# Patient Record
Sex: FEMALE | Race: BLACK OR AFRICAN AMERICAN | NOT HISPANIC OR LATINO | URBAN - METROPOLITAN AREA
[De-identification: names, ages, dates, MRNs, and addresses within clinical notes are randomized per-mention and may not be internally consistent; named-entity substitution may affect disease eponyms.]

---

## 2024-11-05 ENCOUNTER — INPATIENT (INPATIENT)
Facility: HOSPITAL | Age: 69
LOS: 9 days | Discharge: NURSING, REHAB OR LONG-TERM CARE | End: 2024-11-15
Attending: STUDENT IN AN ORGANIZED HEALTH CARE EDUCATION/TRAINING PROGRAM | Admitting: STUDENT IN AN ORGANIZED HEALTH CARE EDUCATION/TRAINING PROGRAM
Payer: MEDICARE

## 2024-11-05 VITALS
DIASTOLIC BLOOD PRESSURE: 100 MMHG | OXYGEN SATURATION: 99 % | HEART RATE: 104 BPM | RESPIRATION RATE: 18 BRPM | TEMPERATURE: 98 F | SYSTOLIC BLOOD PRESSURE: 155 MMHG | WEIGHT: 154.98 LBS

## 2024-11-05 DIAGNOSIS — F32.A DEPRESSION, UNSPECIFIED: ICD-10-CM

## 2024-11-05 DIAGNOSIS — E78.5 HYPERLIPIDEMIA, UNSPECIFIED: ICD-10-CM

## 2024-11-05 DIAGNOSIS — R41.0 DISORIENTATION, UNSPECIFIED: ICD-10-CM

## 2024-11-05 DIAGNOSIS — E11.9 TYPE 2 DIABETES MELLITUS WITHOUT COMPLICATIONS: ICD-10-CM

## 2024-11-05 DIAGNOSIS — I63.9 CEREBRAL INFARCTION, UNSPECIFIED: ICD-10-CM

## 2024-11-05 DIAGNOSIS — I16.0 HYPERTENSIVE URGENCY: ICD-10-CM

## 2024-11-05 DIAGNOSIS — F03.918 UNSPECIFIED DEMENTIA, UNSPECIFIED SEVERITY, WITH OTHER BEHAVIORAL DISTURBANCE: ICD-10-CM

## 2024-11-05 PROBLEM — F03.90 UNSPECIFIED DEMENTIA, UNSPECIFIED SEVERITY, WITHOUT BEHAVIORAL DISTURBANCE, PSYCHOTIC DISTURBANCE, MOOD DISTURBANCE, AND ANXIETY: Chronic | Status: ACTIVE | Noted: 2024-04-16

## 2024-11-05 PROBLEM — F32.9 MAJOR DEPRESSIVE DISORDER, SINGLE EPISODE, UNSPECIFIED: Chronic | Status: ACTIVE | Noted: 2024-04-16

## 2024-11-05 PROBLEM — I10 ESSENTIAL (PRIMARY) HYPERTENSION: Chronic | Status: ACTIVE | Noted: 2024-04-16

## 2024-11-05 LAB
ALBUMIN SERPL ELPH-MCNC: 4 G/DL — SIGNIFICANT CHANGE UP (ref 3.3–5)
ALP SERPL-CCNC: 63 U/L — SIGNIFICANT CHANGE UP (ref 40–120)
ALT FLD-CCNC: 13 U/L — SIGNIFICANT CHANGE UP (ref 4–33)
ANION GAP SERPL CALC-SCNC: 12 MMOL/L — SIGNIFICANT CHANGE UP (ref 7–14)
APAP SERPL-MCNC: <10 UG/ML — LOW (ref 15–25)
APPEARANCE UR: CLEAR — SIGNIFICANT CHANGE UP
AST SERPL-CCNC: 19 U/L — SIGNIFICANT CHANGE UP (ref 4–32)
BACTERIA # UR AUTO: NEGATIVE /HPF — SIGNIFICANT CHANGE UP
BASOPHILS # BLD AUTO: 0.03 K/UL — SIGNIFICANT CHANGE UP (ref 0–0.2)
BASOPHILS NFR BLD AUTO: 0.5 % — SIGNIFICANT CHANGE UP (ref 0–2)
BILIRUB SERPL-MCNC: <0.2 MG/DL — SIGNIFICANT CHANGE UP (ref 0.2–1.2)
BILIRUB UR-MCNC: NEGATIVE — SIGNIFICANT CHANGE UP
BLOOD GAS VENOUS COMPREHENSIVE RESULT: SIGNIFICANT CHANGE UP
BUN SERPL-MCNC: 8 MG/DL — SIGNIFICANT CHANGE UP (ref 7–23)
CALCIUM SERPL-MCNC: 9.4 MG/DL — SIGNIFICANT CHANGE UP (ref 8.4–10.5)
CAST: 2 /LPF — SIGNIFICANT CHANGE UP (ref 0–4)
CHLORIDE SERPL-SCNC: 98 MMOL/L — SIGNIFICANT CHANGE UP (ref 98–107)
CO2 SERPL-SCNC: 26 MMOL/L — SIGNIFICANT CHANGE UP (ref 22–31)
COLOR SPEC: YELLOW — SIGNIFICANT CHANGE UP
CREAT SERPL-MCNC: 1.24 MG/DL — SIGNIFICANT CHANGE UP (ref 0.5–1.3)
DIFF PNL FLD: NEGATIVE — SIGNIFICANT CHANGE UP
EGFR: 47 ML/MIN/1.73M2 — LOW
EOSINOPHIL # BLD AUTO: 0.17 K/UL — SIGNIFICANT CHANGE UP (ref 0–0.5)
EOSINOPHIL NFR BLD AUTO: 2.6 % — SIGNIFICANT CHANGE UP (ref 0–6)
ETHANOL SERPL-MCNC: <10 MG/DL — SIGNIFICANT CHANGE UP
FLUAV AG NPH QL: SIGNIFICANT CHANGE UP
FLUBV AG NPH QL: SIGNIFICANT CHANGE UP
GLUCOSE BLDC GLUCOMTR-MCNC: 220 MG/DL — HIGH (ref 70–99)
GLUCOSE BLDC GLUCOMTR-MCNC: 247 MG/DL — HIGH (ref 70–99)
GLUCOSE SERPL-MCNC: 223 MG/DL — HIGH (ref 70–99)
GLUCOSE UR QL: 250 MG/DL
HCT VFR BLD CALC: 34.8 % — SIGNIFICANT CHANGE UP (ref 34.5–45)
HGB BLD-MCNC: 11.7 G/DL — SIGNIFICANT CHANGE UP (ref 11.5–15.5)
IANC: 3.45 K/UL — SIGNIFICANT CHANGE UP (ref 1.8–7.4)
IMM GRANULOCYTES NFR BLD AUTO: 0.2 % — SIGNIFICANT CHANGE UP (ref 0–0.9)
KETONES UR-MCNC: NEGATIVE MG/DL — SIGNIFICANT CHANGE UP
LEUKOCYTE ESTERASE UR-ACNC: NEGATIVE — SIGNIFICANT CHANGE UP
LYMPHOCYTES # BLD AUTO: 2.55 K/UL — SIGNIFICANT CHANGE UP (ref 1–3.3)
LYMPHOCYTES # BLD AUTO: 38.8 % — SIGNIFICANT CHANGE UP (ref 13–44)
MCHC RBC-ENTMCNC: 30.1 PG — SIGNIFICANT CHANGE UP (ref 27–34)
MCHC RBC-ENTMCNC: 33.6 G/DL — SIGNIFICANT CHANGE UP (ref 32–36)
MCV RBC AUTO: 89.5 FL — SIGNIFICANT CHANGE UP (ref 80–100)
MONOCYTES # BLD AUTO: 0.36 K/UL — SIGNIFICANT CHANGE UP (ref 0–0.9)
MONOCYTES NFR BLD AUTO: 5.5 % — SIGNIFICANT CHANGE UP (ref 2–14)
NEUTROPHILS # BLD AUTO: 3.45 K/UL — SIGNIFICANT CHANGE UP (ref 1.8–7.4)
NEUTROPHILS NFR BLD AUTO: 52.4 % — SIGNIFICANT CHANGE UP (ref 43–77)
NITRITE UR-MCNC: NEGATIVE — SIGNIFICANT CHANGE UP
NRBC # BLD: 0 /100 WBCS — SIGNIFICANT CHANGE UP (ref 0–0)
NRBC # FLD: 0 K/UL — SIGNIFICANT CHANGE UP (ref 0–0)
PH UR: 7 — SIGNIFICANT CHANGE UP (ref 5–8)
PHOSPHATE SERPL-MCNC: 2.8 MG/DL — SIGNIFICANT CHANGE UP (ref 2.5–4.5)
PLATELET # BLD AUTO: 258 K/UL — SIGNIFICANT CHANGE UP (ref 150–400)
POTASSIUM SERPL-MCNC: 4 MMOL/L — SIGNIFICANT CHANGE UP (ref 3.5–5.3)
POTASSIUM SERPL-SCNC: 4 MMOL/L — SIGNIFICANT CHANGE UP (ref 3.5–5.3)
PROT SERPL-MCNC: 7.7 G/DL — SIGNIFICANT CHANGE UP (ref 6–8.3)
PROT UR-MCNC: NEGATIVE MG/DL — SIGNIFICANT CHANGE UP
RBC # BLD: 3.89 M/UL — SIGNIFICANT CHANGE UP (ref 3.8–5.2)
RBC # FLD: 13.3 % — SIGNIFICANT CHANGE UP (ref 10.3–14.5)
RBC CASTS # UR COMP ASSIST: 1 /HPF — SIGNIFICANT CHANGE UP (ref 0–4)
RSV RNA NPH QL NAA+NON-PROBE: SIGNIFICANT CHANGE UP
SALICYLATES SERPL-MCNC: <0.3 MG/DL — LOW (ref 15–30)
SARS-COV-2 RNA SPEC QL NAA+PROBE: SIGNIFICANT CHANGE UP
SODIUM SERPL-SCNC: 136 MMOL/L — SIGNIFICANT CHANGE UP (ref 135–145)
SP GR SPEC: 1.01 — SIGNIFICANT CHANGE UP (ref 1–1.03)
SQUAMOUS # UR AUTO: 0 /HPF — SIGNIFICANT CHANGE UP (ref 0–5)
TSH SERPL-MCNC: 1.57 UIU/ML — SIGNIFICANT CHANGE UP (ref 0.27–4.2)
UROBILINOGEN FLD QL: 0.2 MG/DL — SIGNIFICANT CHANGE UP (ref 0.2–1)
WBC # BLD: 6.57 K/UL — SIGNIFICANT CHANGE UP (ref 3.8–10.5)
WBC # FLD AUTO: 6.57 K/UL — SIGNIFICANT CHANGE UP (ref 3.8–10.5)
WBC UR QL: 0 /HPF — SIGNIFICANT CHANGE UP (ref 0–5)

## 2024-11-05 PROCEDURE — 99497 ADVNCD CARE PLAN 30 MIN: CPT | Mod: 25

## 2024-11-05 PROCEDURE — 70450 CT HEAD/BRAIN W/O DYE: CPT | Mod: 26,MC

## 2024-11-05 PROCEDURE — 99285 EMERGENCY DEPT VISIT HI MDM: CPT

## 2024-11-05 PROCEDURE — 99223 1ST HOSP IP/OBS HIGH 75: CPT | Mod: 25

## 2024-11-05 PROCEDURE — 71046 X-RAY EXAM CHEST 2 VIEWS: CPT | Mod: 26

## 2024-11-05 RX ORDER — QUETIAPINE FUMARATE 200 MG/1
25 TABLET ORAL AT BEDTIME
Refills: 0 | Status: DISCONTINUED | OUTPATIENT
Start: 2024-11-05 | End: 2024-11-15

## 2024-11-05 RX ORDER — SODIUM CHLORIDE 9 MG/ML
1000 INJECTION, SOLUTION INTRAMUSCULAR; INTRAVENOUS; SUBCUTANEOUS ONCE
Refills: 0 | Status: COMPLETED | OUTPATIENT
Start: 2024-11-05 | End: 2024-11-05

## 2024-11-05 RX ORDER — ESCITALOPRAM 10 MG/1
10 TABLET, FILM COATED ORAL DAILY
Refills: 0 | Status: DISCONTINUED | OUTPATIENT
Start: 2024-11-05 | End: 2024-11-15

## 2024-11-05 RX ORDER — GLUCAGON INJECTION, SOLUTION 1 MG/.2ML
1 INJECTION, SOLUTION SUBCUTANEOUS ONCE
Refills: 0 | Status: DISCONTINUED | OUTPATIENT
Start: 2024-11-05 | End: 2024-11-15

## 2024-11-05 RX ORDER — ENOXAPARIN SODIUM 40MG/0.4ML
40 SYRINGE (ML) SUBCUTANEOUS EVERY 24 HOURS
Refills: 0 | Status: DISCONTINUED | OUTPATIENT
Start: 2024-11-06 | End: 2024-11-15

## 2024-11-05 RX ORDER — METFORMIN HYDROCHLORIDE 500 MG/1
1 TABLET, EXTENDED RELEASE ORAL
Refills: 0 | DISCHARGE

## 2024-11-05 RX ORDER — AMLODIPINE BESYLATE 10 MG
5 TABLET ORAL
Refills: 0 | Status: DISCONTINUED | OUTPATIENT
Start: 2024-11-05 | End: 2024-11-15

## 2024-11-05 RX ORDER — INSULIN LISPRO 100/ML
VIAL (ML) SUBCUTANEOUS AT BEDTIME
Refills: 0 | Status: DISCONTINUED | OUTPATIENT
Start: 2024-11-05 | End: 2024-11-15

## 2024-11-05 RX ORDER — MAGNESIUM, ALUMINUM HYDROXIDE 200-200 MG
30 TABLET,CHEWABLE ORAL EVERY 4 HOURS
Refills: 0 | Status: DISCONTINUED | OUTPATIENT
Start: 2024-11-05 | End: 2024-11-15

## 2024-11-05 RX ORDER — AMLODIPINE BESYLATE 10 MG
5 TABLET ORAL ONCE
Refills: 0 | Status: COMPLETED | OUTPATIENT
Start: 2024-11-05 | End: 2024-11-05

## 2024-11-05 RX ORDER — LISINOPRIL 40 MG
40 TABLET ORAL
Refills: 0 | Status: DISCONTINUED | OUTPATIENT
Start: 2024-11-05 | End: 2024-11-15

## 2024-11-05 RX ORDER — ONDANSETRON HYDROCHLORIDE 2 MG/ML
4 INJECTION, SOLUTION INTRAMUSCULAR; INTRAVENOUS EVERY 8 HOURS
Refills: 0 | Status: DISCONTINUED | OUTPATIENT
Start: 2024-11-05 | End: 2024-11-15

## 2024-11-05 RX ORDER — INSULIN LISPRO 100/ML
VIAL (ML) SUBCUTANEOUS
Refills: 0 | Status: DISCONTINUED | OUTPATIENT
Start: 2024-11-05 | End: 2024-11-15

## 2024-11-05 RX ORDER — ASPIRIN/MAG CARB/ALUMINUM AMIN 325 MG
81 TABLET ORAL DAILY
Refills: 0 | Status: DISCONTINUED | OUTPATIENT
Start: 2024-11-05 | End: 2024-11-15

## 2024-11-05 RX ORDER — ACETAMINOPHEN 500 MG
650 TABLET ORAL EVERY 6 HOURS
Refills: 0 | Status: DISCONTINUED | OUTPATIENT
Start: 2024-11-05 | End: 2024-11-15

## 2024-11-05 RX ORDER — MELATONIN 5 MG
3 TABLET ORAL AT BEDTIME
Refills: 0 | Status: DISCONTINUED | OUTPATIENT
Start: 2024-11-05 | End: 2024-11-15

## 2024-11-05 RX ORDER — LISINOPRIL 40 MG
20 TABLET ORAL DAILY
Refills: 0 | Status: DISCONTINUED | OUTPATIENT
Start: 2024-11-05 | End: 2024-11-05

## 2024-11-05 RX ADMIN — QUETIAPINE FUMARATE 25 MILLIGRAM(S): 200 TABLET ORAL at 23:08

## 2024-11-05 RX ADMIN — Medication 80 MILLIGRAM(S): at 23:09

## 2024-11-05 RX ADMIN — Medication 5 MILLIGRAM(S): at 15:48

## 2024-11-05 RX ADMIN — Medication 20 MILLIGRAM(S): at 18:49

## 2024-11-05 RX ADMIN — SODIUM CHLORIDE 1000 MILLILITER(S): 9 INJECTION, SOLUTION INTRAMUSCULAR; INTRAVENOUS; SUBCUTANEOUS at 11:46

## 2024-11-05 RX ADMIN — SODIUM CHLORIDE 1000 MILLILITER(S): 9 INJECTION, SOLUTION INTRAMUSCULAR; INTRAVENOUS; SUBCUTANEOUS at 18:49

## 2024-11-05 RX ADMIN — Medication 3 MILLIGRAM(S): at 23:08

## 2024-11-05 NOTE — H&P ADULT - CONVERSATION DETAILS
Pt daughter IS agreeable to: Noninvasive mechanical ventilation , IV fluids, Antibiotics, vasopressors    Pt daughter is NOT agreeable to: CPR, Intubation and mechanical ventilation,  Feeding tube,  Dialysis, Blood transfusions
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
## 2024-11-05 NOTE — ED BEHAVIORAL HEALTH ASSESSMENT NOTE - NSBHMSEIMPULSE_PSY_A_CORE
PAST SURGICAL HISTORY:  CAD (coronary artery disease) stents (4)    H/O arthroscopy of knee RT -2 AND LT -1    H/O arthroscopy of shoulder RT ROTATOR CUFF 2019    H/O hernia repair RT INGUINAL -2 AND LT INGUINAL -1    History of parathyroid surgery     Kidney stone RT ESWL OCT 2019    S/P cardiac cath     S/P cataract surgery rt and lt     Normal

## 2024-11-05 NOTE — ED BEHAVIORAL HEALTH ASSESSMENT NOTE - DESCRIPTION
calm and cooperative   ICU Vital Signs Last 24 Hrs  T(C): 36.8 (05 Nov 2024 15:44), Max: 36.9 (05 Nov 2024 09:46)  T(F): 98.2 (05 Nov 2024 15:44), Max: 98.4 (05 Nov 2024 09:46)  HR: 88 (05 Nov 2024 15:44) (88 - 104)  BP: 190/104 (05 Nov 2024 15:44) (155/100 - 190/104)  BP(mean): --  ABP: --  ABP(mean): --  RR: 16 (05 Nov 2024 15:44) (16 - 18)  SpO2: 100% (05 Nov 2024 15:44) (99% - 100%)    O2 Parameters below as of 05 Nov 2024 15:44  Patient On (Oxygen Delivery Method): room air Lives with daughter, , immigrated from [place] HTN, DM

## 2024-11-05 NOTE — ED BEHAVIORAL HEALTH ASSESSMENT NOTE - MEDICAL ISSUES AND PLAN (INCLUDE STANDING AND PRN MEDICATION)
Would continue Metformin 500 mg daily and sliding scale insulin, continue Amlodipine 5-40 daily and Atorvastatin 80 mg hs - see summary -

## 2024-11-05 NOTE — ED ADULT NURSE REASSESSMENT NOTE - NS ED NURSE REASSESS COMMENT FT1
Pt becomes tearful talking about events of the morning - states her daughter called her crazy. Pt states she is safe living with her daughter, just does not like living there with her. Denies abuse or neglect. No signs of trauma or abuse on RN assessment.
Pt remains hypertensive, cannot go to low 5 unit. Pending inpatient medical admission. Remains in stretcher, in view of RNs. safety maintained.
Pt to bathroom for UA, obtained and sent. Back to stretcher. eating meal tray after ok'd by provider. tolerating. denies pain or needs. safety maintained. in view of RN for safety.
Pt received from previous shift. Pt is alert, oriented to name  month and hospital. Unsure of year. States she is 31 years old. Pt states her daughter called an ambulance this morning "because she called me crazy." Pt states to dementia dx. Denies SI/HI/AH/VH. speech is clear, no focal neuro deficits noted. moves all extremities, denies any pain, chest pain or sob. Calm, cooperative. Pt gowned and awaiting provider exam. Call light provided. safety maintained.

## 2024-11-05 NOTE — ED BEHAVIORAL HEALTH ASSESSMENT NOTE - HPI (INCLUDE ILLNESS QUALITY, SEVERITY, DURATION, TIMING, CONTEXT, MODIFYING FACTORS, ASSOCIATED SIGNS AND SYMPTOMS)
Patient is a 69 year old, female; domicile with family; ; noncaregiver; PPH of dementia; no hospitalizations; no known suicide attempts; no known history of violence or arrests; no active substance abuse or known history of complicated withdrawal; PMH of HTN and DM; brought in by EMS; called by daughter for worsening agitation and visual hallucinations.    Patient seen in medical ED. She is a poor historian and is not able to provide HPI. Patient  is A&Ox1 ( knows she is in a hospital), and reports she was brought to the hospital because her "blood pressure was too high." Patient reports she lives with her daughter and her son visits daily. She reports her mood has been intermittently depressed because she has a HHA caring for her and missed her independence. Patient reports her sleep and appetite have been good but acknowledges her short term and long term memory have been declining. Patient denies current or past suicidal ideation or thoughts of hurting others. She denies recent or past aggression and states her daughter is supportive but they do not always get along. Patient ambulates with a walker at home and denies recent falls. Patient denies recent or current auditory/visual hallucinations, ideas of reference or thoughts of paranoia.     See BH note

## 2024-11-05 NOTE — ED PROVIDER NOTE - ATTENDING APP SHARED VISIT CONTRIBUTION OF CARE
DR. HODGES, ATTENDING MD-  I personally saw the patient with the PA and performed a substantive portion of the visit including all aspects of the medical decision making.    68 y/o female h/o dementia dm bibems with inc aggression and leaving the house.  Family not able to care for pt's safety any longer.  Likely worsening dementia.  Calm cooperative here.  Eval for organic cause of worsening dementia.  Obtain cbc cmp ct head ua ucx cxr admit for further care and likely placement.
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
## 2024-11-05 NOTE — ED ADULT NURSE NOTE - OBJECTIVE STATEMENT
Pt received from previous shift. Pt is alert, oriented to name  month and hospital. Unsure of year. States she is 31 years old. Pt states her daughter called an ambulance this morning "because she called me crazy." Pt states to dementia dx. Denies SI/HI/AH/VH. speech is clear, no focal neuro deficits noted. moves all extremities, denies any pain, chest pain or sob. Calm, cooperative.

## 2024-11-05 NOTE — H&P ADULT - NSHPPHYSICALEXAM_GEN_ALL_CORE
PHYSICAL EXAM:  GENERAL: NAD, comfortable at bedside   HEAD:  Atraumatic, Normocephalic  EYES: EOMI, PERRL, conjunctiva and sclera clear  NECK: Supple, No JVD  CHEST/LUNG: Clear to auscultation bilaterally; No wheezes, rales or rhonchi  HEART: Regular rate and rhythm; No murmurs, rubs, or gallops, (+)S1, S2  ABDOMEN: Soft, Nontender, Nondistended; Normal Bowel sounds   EXTREMITIES:  2+ Peripheral Pulses, No clubbing, cyanosis, or edema  PSYCH: calm, answering questions appropriately   NEUROLOGY: AAO: Name and  correctly answered, place- hospital but cannot say which, year   SKIN: No rashes or lesions

## 2024-11-05 NOTE — H&P ADULT - PROBLEM SELECTOR PLAN 3
Chronic moderate exacerbation  Hold home Trulicity weekly, glipizide 10mg daily, metformin 500mg nightly   LDCS with diabetic diet   A1c and lipid panel in AM

## 2024-11-05 NOTE — ED BEHAVIORAL HEALTH ASSESSMENT NOTE - SUMMARY
Patient is a 69 year old, female; domicile with family; ; noncaregiver; PPH of dementia; no hospitalizations; no known suicide attempts; no known history of violence or arrests; no active substance abuse or known history of complicated withdrawal; PMH of HTN and DM; brought in by EMS; called by daughter for worsening agitation and visual hallucinations.    Patient seen and evaluated. She is A&Ox1. Patient has been calm and cooperative since her arrival to the ED and has not required any prn's. Collateral from daughter reports patient has been increasing aggressive and experiencing visual hallucinations. Patient has also been wandering and daughter does not feel safe bringing her home and is seeking long term placement. Patient has short and long term memory impairment and has been diagnosed with dementia. Cat of head revealed old left PCA territory infarct. Patient will be admitted to [facility] on an involuntary status once medically cleared. It is unclear if patient has not been compliant with medications and upon arrival patient was hypertensive 190/104 and hyperglycemic. Per medical NAMAN Menendez, patient is stable ambulating with walker. Patient is a 69 year old, female; domicile with family; ; noncaregiver; PPH of dementia; no hospitalizations; no known suicide attempts; no known history of violence or arrests; no active substance abuse or known history of complicated withdrawal; PMH of HTN and DM; brought in by EMS; called by daughter for worsening agitation and visual hallucinations.    Patient seen and evaluated. She is A&Ox1. Patient has been calm and cooperative since her arrival to the ED and has not required any prn's. Collateral from daughter reports patient has been increasing aggressive and experiencing visual hallucinations. Patient has also been wandering and daughter does not feel safe bringing her home and is seeking long term placement. Patient has short and long term memory impairment and has been diagnosed with dementia. Cat of head revealed old left PCA territory infarct. Patient will be admitted to [facility] on an involuntary status once medically cleared. It is unclear if patient has not been compliant with medications and upon arrival patient was hypertensive 190/104 and hyperglycemic. Per hunter Menendez, patient is stable ambulating with walker.  Recommend  1.Ambulate with walker  2. Discussed medical medications with hunter Menendez. Would hold all diabetic medications except     Metformin 500 mg daily and place on sliding scale coverage.  3. Substitute home medication Amlodipine 5-40 with Amlodipine 5 mg daily and Lisinopril 20 mg daily   4. Would decrease Lexapro to 10 mg daily- inconsistent with home medication  5. Continue Seroquel 25 mg hs  6. Continue Atorvastatin 80 mg hs. Patient is a 69 year old, female; domicile with family; ; noncaregiver; PPH of dementia; no hospitalizations; no known suicide attempts; no known history of violence or arrests; no active substance abuse or known history of complicated withdrawal; PMH of HTN and DM; brought in by EMS; called by daughter for worsening agitation and visual hallucinations.    Patient seen and evaluated. She is A&Ox1. Patient has been calm and cooperative since her arrival to the ED and has not required any prn's. Collateral from daughter reports patient has been increasing aggressive and experiencing visual hallucinations. Patient has also been wandering and daughter does not feel safe bringing her home and is seeking long term placement. Patient has short and long term memory impairment and has been diagnosed with dementia. Cat of head revealed old left PCA territory infarct. Patient will be admitted to [facility] on an involuntary status once medically cleared. It is unclear if patient has not been compliant with medications and upon arrival patient was hypertensive 190/104 and hyperglycemic. Per hunter Menendez, patient is stable ambulating with walker.  Recommend  1.Ambulate with walker  2. Discussed medical medications with hunter Menendez. Would hold all diabetic medications except     Metformin 500 mg daily and place on sliding scale coverage.  3. Substitute home medication Amlodipine 5-40 with Amlodipine 5 mg daily and Lisinopril 20 mg daily   4. Would decrease Lexapro to 10 mg daily- inconsistent with home medication  5. Continue Seroquel 25 mg hs  6. Continue Atorvastatin 80 mg     jAddendum - Patient admitted to medicine for hypertension. CL will follow

## 2024-11-05 NOTE — H&P ADULT - NSHPSOCIALHISTORY_GEN_ALL_CORE
Does not use tobacco products, consume alcohol or partake in illicit drug use   lives with   daughter
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
## 2024-11-05 NOTE — ED BEHAVIORAL HEALTH ASSESSMENT NOTE - NS ED BHA PLAN MSU PSYCHIATRIC ISSUES2 FT
will be followed by CL.  Recommend decreasing Lexapro to 10 mg daily - unsure if patient has been compliant- continue Seroquel 25 mg hs which may help with sleep.

## 2024-11-05 NOTE — ED ADULT NURSE NOTE - BREATH SOUNDS, MLM
What Type Of Note Output Would You Prefer (Optional)?: Standard Output How Severe Is Your Skin Lesion?: mild Has Your Skin Lesion Been Treated?: not been treated Is This A New Presentation, Or A Follow-Up?: Skin Lesion Clear

## 2024-11-05 NOTE — ED BEHAVIORAL HEALTH ASSESSMENT NOTE - NSBHATTESTCOMMENTATTENDFT_PSY_A_CORE
Patient is a 69 year old, female; domicile with family; ; noncaregiver; PPH of dementia; no hospitalizations; no known suicide attempts; no known history of violence or arrests; no active substance abuse or known history of complicated withdrawal; PMH of HTN and DM; brought in by EMS; called by daughter for worsening agitation and visual hallucinations.    Patient seen and evaluated. She is A&Ox1. Patient has been calm and cooperative since her arrival to the ED and has not required any prn's. Collateral from daughter reports patient has been increasing aggressive and experiencing visual hallucinations. Patient has also been wandering and daughter does not feel safe bringing her home and is seeking long term placement.   Pt hypertensive and will be admitted to medicine service.

## 2024-11-05 NOTE — ED PROVIDER NOTE - PROGRESS NOTE DETAILS
NAMAN Sparks: received pt in sign out pending RVP results and repeat BP  RVP negative, repeat BP still slightly elevated- psych requesting medical admission  discussed with hospitalist, 1:1 ordered

## 2024-11-05 NOTE — ED PROVIDER NOTE - CADM POA CENTRAL LINE
Received request via: Pharmacy    Was the patient seen in the last year in this department? Yes    Does the patient have an active prescription (recently filled or refills available) for medication(s) requested? No   No

## 2024-11-05 NOTE — ED PROVIDER NOTE - CRANIAL NERVE AND PUPILLARY EXAM
Acute UTI
cranial nerves 2-12 intact/cough reflex intact/corneal reflex intact/central and peripheral vision intact

## 2024-11-05 NOTE — ED BEHAVIORAL HEALTH ASSESSMENT NOTE - RISK ASSESSMENT
low risk - denies suicidal ideation, no prior suicide attempts, Baptism, no substance abuse,   risk- advanced age, depressed.
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
## 2024-11-05 NOTE — ED BEHAVIORAL HEALTH ASSESSMENT NOTE - CURRENT MEDICATION
Escitalopram 20mg daily, quetiapine 25mg daily, metformin 500mg daily, Trulicity 3mg weekly, Farxiga 5mg daily, glipizide 10mg daily, amlodipine 5-40mg daily, and atorvastatin 80mg daily

## 2024-11-05 NOTE — H&P ADULT - NSHPLABSRESULTS_GEN_ALL_CORE
What Type Of Note Output Would You Prefer (Optional)?: Bullet Format Hpi Title: Evaluation of Skin Lesions 11.7   6.57  )-----------( 258      ( 2024 11:14 )             34.8     136  |  98  |  8   ----------------------------<  223[H]       4.0   |  26  |  1.24    Ca    9.4      2024 11:14  Phos  2.8         TPro  7.7  /  Alb  4.0  /  TBili  <0.2  /  DBili  x   /  AST  19  /  ALT  13  /  AlkPhos  63          11:45 - VBG - pH: 7.37  | pCO2: 54    | pO2: 39    | Lactate: 1.9        Urinalysis Basic - ( 2024 13:30 )  Color: Yellow / Appearance: Clear / S.007 / pH: 7.0  Gluc: 250 mg/dL / Ketone: Negative mg/dL  / Bili: Negative / Urobili: 0.2 mg/dL   Blood: Negative / Protein: Negative mg/dL / Nitrite: Negative   Leuk Esterase: Negative / RBC: 1 /HPF / WBC 0 /HPF   Sq Epi: 0 /HPF / Bacteria: Negative /HPF  Hyaline Casts: x/WBC Casts: x      ekg interpreted by myself: nsr   CXR interpreted by myself: clear lungs  CT head interpreted by radiology: No acute intracranial hemorrhage, mass effect, or midline shift. Old left   PCA territory infarct. If clinical examination persists, consider further   evaluation via MR imaging to include DWI and ADC mapping techniques,   provided there are no contraindications.

## 2024-11-05 NOTE — ED ADULT TRIAGE NOTE - CHIEF COMPLAINT QUOTE
pt brought in by EMS from home sent by daughter because she was packing her bags and trying to leave the house. pt has a hx of dementia and diabetes. pt denies chest pain, sob, n/v/d, fever or chills.

## 2024-11-05 NOTE — ED ADULT TRIAGE NOTE - NSWEIGHTCALCTOOLDRUG_GEN_A_CORE
Fax from pharmacy, insurance will not cover longer than 7 days at a time. PA needed.   Faxed to [facility].     used
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
## 2024-11-05 NOTE — ED ADULT NURSE NOTE - NSFALLHARMRISKINTERV_ED_ALL_ED

## 2024-11-05 NOTE — ED PROVIDER NOTE - NSICDXPASTMEDICALHX_GEN_ALL_CORE_FT
PAST MEDICAL HISTORY:  CVA (cerebrovascular accident)     Dementia     Diabetes mellitus type II, non insulin dependent     HLD (hyperlipidemia)     HTN (hypertension)     Major depression, chronic
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
## 2024-11-05 NOTE — ED BEHAVIORAL HEALTH ASSESSMENT NOTE - NSBHATTESTAPPAMEND_PSY_A_CORE
I have personally seen and examined this patient. I fully participated in the care of this patient. I have made amendments to the documentation where appropriate and otherwise agree with the history, physical exam, and plan as documented by the CASI

## 2024-11-05 NOTE — H&P ADULT - PROBLEM SELECTOR PLAN 2
New  worsening aggression with agitation  UA/RVP/CXR wnl   consulted: "Would decrease Lexapro to 10 mg daily- inconsistent with home medication. Continue Seroquel 25 mg hs"  Case management consulted as daughter would like placement for the pt on d/c

## 2024-11-06 LAB
A1C WITH ESTIMATED AVERAGE GLUCOSE RESULT: 6.6 % — HIGH (ref 4–5.6)
ANION GAP SERPL CALC-SCNC: 13 MMOL/L — SIGNIFICANT CHANGE UP (ref 7–14)
BASOPHILS # BLD AUTO: 0.03 K/UL — SIGNIFICANT CHANGE UP (ref 0–0.2)
BASOPHILS NFR BLD AUTO: 0.4 % — SIGNIFICANT CHANGE UP (ref 0–2)
BUN SERPL-MCNC: 7 MG/DL — SIGNIFICANT CHANGE UP (ref 7–23)
CALCIUM SERPL-MCNC: 8.9 MG/DL — SIGNIFICANT CHANGE UP (ref 8.4–10.5)
CHLORIDE SERPL-SCNC: 106 MMOL/L — SIGNIFICANT CHANGE UP (ref 98–107)
CHOLEST SERPL-MCNC: 212 MG/DL — HIGH
CO2 SERPL-SCNC: 24 MMOL/L — SIGNIFICANT CHANGE UP (ref 22–31)
CREAT SERPL-MCNC: 1.02 MG/DL — SIGNIFICANT CHANGE UP (ref 0.5–1.3)
EGFR: 59 ML/MIN/1.73M2 — LOW
EOSINOPHIL # BLD AUTO: 0.22 K/UL — SIGNIFICANT CHANGE UP (ref 0–0.5)
EOSINOPHIL NFR BLD AUTO: 3.1 % — SIGNIFICANT CHANGE UP (ref 0–6)
ESTIMATED AVERAGE GLUCOSE: 143 — SIGNIFICANT CHANGE UP
GLUCOSE BLDC GLUCOMTR-MCNC: 148 MG/DL — HIGH (ref 70–99)
GLUCOSE BLDC GLUCOMTR-MCNC: 157 MG/DL — HIGH (ref 70–99)
GLUCOSE BLDC GLUCOMTR-MCNC: 165 MG/DL — HIGH (ref 70–99)
GLUCOSE BLDC GLUCOMTR-MCNC: 205 MG/DL — HIGH (ref 70–99)
GLUCOSE SERPL-MCNC: 149 MG/DL — HIGH (ref 70–99)
HCT VFR BLD CALC: 32.9 % — LOW (ref 34.5–45)
HDLC SERPL-MCNC: 74 MG/DL — SIGNIFICANT CHANGE UP
HGB BLD-MCNC: 11.1 G/DL — LOW (ref 11.5–15.5)
IANC: 2.38 K/UL — SIGNIFICANT CHANGE UP (ref 1.8–7.4)
IMM GRANULOCYTES NFR BLD AUTO: 0.1 % — SIGNIFICANT CHANGE UP (ref 0–0.9)
LIPID PNL WITH DIRECT LDL SERPL: 115 MG/DL — HIGH
LYMPHOCYTES # BLD AUTO: 3.94 K/UL — HIGH (ref 1–3.3)
LYMPHOCYTES # BLD AUTO: 56.2 % — HIGH (ref 13–44)
MCHC RBC-ENTMCNC: 30.1 PG — SIGNIFICANT CHANGE UP (ref 27–34)
MCHC RBC-ENTMCNC: 33.7 G/DL — SIGNIFICANT CHANGE UP (ref 32–36)
MCV RBC AUTO: 89.2 FL — SIGNIFICANT CHANGE UP (ref 80–100)
MONOCYTES # BLD AUTO: 0.43 K/UL — SIGNIFICANT CHANGE UP (ref 0–0.9)
MONOCYTES NFR BLD AUTO: 6.1 % — SIGNIFICANT CHANGE UP (ref 2–14)
NEUTROPHILS # BLD AUTO: 2.38 K/UL — SIGNIFICANT CHANGE UP (ref 1.8–7.4)
NEUTROPHILS NFR BLD AUTO: 34.1 % — LOW (ref 43–77)
NON HDL CHOLESTEROL: 138 MG/DL — HIGH
NRBC # BLD: 0 /100 WBCS — SIGNIFICANT CHANGE UP (ref 0–0)
NRBC # FLD: 0 K/UL — SIGNIFICANT CHANGE UP (ref 0–0)
PLATELET # BLD AUTO: 232 K/UL — SIGNIFICANT CHANGE UP (ref 150–400)
POTASSIUM SERPL-MCNC: 4 MMOL/L — SIGNIFICANT CHANGE UP (ref 3.5–5.3)
POTASSIUM SERPL-SCNC: 4 MMOL/L — SIGNIFICANT CHANGE UP (ref 3.5–5.3)
RBC # BLD: 3.69 M/UL — LOW (ref 3.8–5.2)
RBC # FLD: 13.1 % — SIGNIFICANT CHANGE UP (ref 10.3–14.5)
SODIUM SERPL-SCNC: 143 MMOL/L — SIGNIFICANT CHANGE UP (ref 135–145)
TRIGL SERPL-MCNC: 117 MG/DL — SIGNIFICANT CHANGE UP
WBC # BLD: 7.01 K/UL — SIGNIFICANT CHANGE UP (ref 3.8–10.5)
WBC # FLD AUTO: 7.01 K/UL — SIGNIFICANT CHANGE UP (ref 3.8–10.5)

## 2024-11-06 PROCEDURE — 99231 SBSQ HOSP IP/OBS SF/LOW 25: CPT

## 2024-11-06 PROCEDURE — 99233 SBSQ HOSP IP/OBS HIGH 50: CPT

## 2024-11-06 RX ORDER — INFLUENZ VIR VAC TV P-SURF2003 15MCG/.5ML
0.5 SYRINGE (ML) INTRAMUSCULAR ONCE
Refills: 0 | Status: DISCONTINUED | OUTPATIENT
Start: 2024-11-06 | End: 2024-11-15

## 2024-11-06 RX ORDER — OLANZAPINE 20 MG/1
1.25 TABLET ORAL EVERY 6 HOURS
Refills: 0 | Status: DISCONTINUED | OUTPATIENT
Start: 2024-11-06 | End: 2024-11-15

## 2024-11-06 RX ADMIN — Medication 40 MILLIGRAM(S): at 09:02

## 2024-11-06 RX ADMIN — Medication 1: at 08:53

## 2024-11-06 RX ADMIN — ESCITALOPRAM 10 MILLIGRAM(S): 10 TABLET, FILM COATED ORAL at 12:42

## 2024-11-06 RX ADMIN — Medication 81 MILLIGRAM(S): at 12:42

## 2024-11-06 RX ADMIN — Medication 80 MILLIGRAM(S): at 21:17

## 2024-11-06 RX ADMIN — Medication 40 MILLIGRAM(S): at 18:22

## 2024-11-06 RX ADMIN — QUETIAPINE FUMARATE 25 MILLIGRAM(S): 200 TABLET ORAL at 21:17

## 2024-11-06 RX ADMIN — Medication 1: at 12:30

## 2024-11-06 RX ADMIN — Medication 5 MILLIGRAM(S): at 09:02

## 2024-11-06 NOTE — PROGRESS NOTE ADULT - SUBJECTIVE AND OBJECTIVE BOX
[facility] Division of Hospital Medicine  Kristen Leach MD  Pager (M-F, 8A-5P):  In-house pager [phone]; Long-range pager [phone]  Other Times:  Please page Hospitalist in Charge -  In-house pager [phone]    Patient is a 69y old  Female who presents with a chief complaint of hypertensive urgency, increased agitation (05 Nov 2024 20:52)    SUBJECTIVE / OVERNIGHT EVENTS:  No problems reported over night.    ADDITIONAL REVIEW OF SYSTEMS:    MEDICATIONS  (STANDING):  amLODIPine   Tablet 5 milliGRAM(s) Oral with breakfast  aspirin enteric coated 81 milliGRAM(s) Oral daily  atorvastatin 80 milliGRAM(s) Oral at bedtime  dextrose 5%. 1000 milliLiter(s) (50 mL/Hr) IV Continuous <Continuous>  dextrose 5%. 1000 milliLiter(s) (100 mL/Hr) IV Continuous <Continuous>  dextrose 50% Injectable 25 Gram(s) IV Push once  dextrose 50% Injectable 12.5 Gram(s) IV Push once  dextrose 50% Injectable 25 Gram(s) IV Push once  enoxaparin Injectable 40 milliGRAM(s) SubCutaneous every 24 hours  escitalopram 10 milliGRAM(s) Oral daily  glucagon  Injectable 1 milliGRAM(s) IntraMuscular once  influenza  Vaccine (HIGH DOSE) 0.5 milliLiter(s) IntraMuscular once  insulin lispro (ADMELOG) corrective regimen sliding scale   SubCutaneous three times a day before meals  insulin lispro (ADMELOG) corrective regimen sliding scale   SubCutaneous at bedtime  lisinopril 40 milliGRAM(s) Oral with breakfast  QUEtiapine 25 milliGRAM(s) Oral at bedtime    MEDICATIONS  (PRN):  acetaminophen     Tablet .. 650 milliGRAM(s) Oral every 6 hours PRN Temp greater or equal to 38C (100.4F), Mild Pain (1 - 3)  aluminum hydroxide/magnesium hydroxide/simethicone Suspension 30 milliLiter(s) Oral every 4 hours PRN Dyspepsia  dextrose Oral Gel 15 Gram(s) Oral once PRN Blood Glucose LESS THAN 70 milliGRAM(s)/deciliter  melatonin 3 milliGRAM(s) Oral at bedtime PRN Insomnia  ondansetron Injectable 4 milliGRAM(s) IV Push every 8 hours PRN Nausea and/or Vomiting    CAPILLARY BLOOD GLUCOSE  POCT Blood Glucose.: 165 mg/dL (06 Nov 2024 11:29)  POCT Blood Glucose.: 157 mg/dL (06 Nov 2024 07:29)  POCT Blood Glucose.: 220 mg/dL (05 Nov 2024 22:15)  POCT Blood Glucose.: 247 mg/dL (05 Nov 2024 20:51)  POCT Blood Glucose.: 192 mg/dL (05 Nov 2024 16:07)    PHYSICAL EXAM:  Vital Signs Last 24 Hrs  T(C): 36.8 (06 Nov 2024 11:00), Max: 37 (05 Nov 2024 20:08)  T(F): 98.2 (06 Nov 2024 11:00), Max: 98.6 (05 Nov 2024 20:08)  HR: 76 (06 Nov 2024 11:00) (76 - 90)  BP: 149/87 (06 Nov 2024 11:00) (145/74 - 190/104)  BP(mean): 134 (05 Nov 2024 20:08) (134 - 134)  RR: 18 (06 Nov 2024 11:00) (16 - 18)  SpO2: 98% (06 Nov 2024 11:00) (98% - 100%)    Parameters below as of 06 Nov 2024 11:00  Patient On (Oxygen Delivery Method): room air    CONSTITUTIONAL: resting comfortably in bed,   RESPIRATORY: Normal respiratory effort; lungs are clear to auscultation bilaterally  CARDIOVASCULAR: Regular rate and rhythm, normal S1 and S2, no murmur/rub/gallop; No lower extremity edema; Peripheral pulses are 2+ bilaterally  ABDOMEN: Nontender to palpation, normoactive bowel sounds, no rebound/guarding  MUSCLOSKELETAL: no clubbing or cyanosis of digits; no joint swelling or tenderness to palpation  PSYCH: calm, oriented self, hospital  NEUROLOGY: CN 2-12 are intact and symmetric; no gross sensory deficits;   SKIN: No rashes; no palpable lesions    LABS:                        11.1   7.01  )-----------( 232      ( 06 Nov 2024 02:30 )             32.9     11-06    143  |  106  |  7   ----------------------------<  149[H]  4.0   |  24  |  1.02    Ca    8.9      06 Nov 2024 02:30  Phos  2.8     11-05    TPro  7.7  /  Alb  4.0  /  TBili  <0.2  /  DBili  x   /  AST  19  /  ALT  13  /  AlkPhos  63  11-05    RADIOLOGY & ADDITIONAL TESTS:  Results Reviewed:   Imaging Personally Reviewed:  Electrocardiogram Personally Reviewed:    COORDINATION OF CARE:  Care Discussed with Consultants/Other Providers [Y/N]: RN, SW, CM, ACP, Psych re overall care   Prior or Outpatient Records Reviewed [Y/N]:   No [facility] Division of Hospital Medicine  Kristen Leach MD  Pager (M-F, 8A-5P):  In-house pager [phone]; Long-range pager [phone]  Other Times:  Please page Hospitalist in Charge -  In-house pager [phone]    Patient is a 69y old  Female who presents with a chief complaint of hypertensive urgency, increased agitation (05 Nov 2024 20:52)    SUBJECTIVE / OVERNIGHT EVENTS:  No problems reported over night.  Pt seen earlier this am, calm, pleasant. Oriented to self, hospital. Not sure why here. No complaints.   ADDITIONAL REVIEW OF SYSTEMS:    MEDICATIONS  (STANDING):  amLODIPine   Tablet 5 milliGRAM(s) Oral with breakfast  aspirin enteric coated 81 milliGRAM(s) Oral daily  atorvastatin 80 milliGRAM(s) Oral at bedtime  dextrose 5%. 1000 milliLiter(s) (50 mL/Hr) IV Continuous <Continuous>  dextrose 5%. 1000 milliLiter(s) (100 mL/Hr) IV Continuous <Continuous>  dextrose 50% Injectable 25 Gram(s) IV Push once  dextrose 50% Injectable 12.5 Gram(s) IV Push once  dextrose 50% Injectable 25 Gram(s) IV Push once  enoxaparin Injectable 40 milliGRAM(s) SubCutaneous every 24 hours  escitalopram 10 milliGRAM(s) Oral daily  glucagon  Injectable 1 milliGRAM(s) IntraMuscular once  influenza  Vaccine (HIGH DOSE) 0.5 milliLiter(s) IntraMuscular once  insulin lispro (ADMELOG) corrective regimen sliding scale   SubCutaneous three times a day before meals  insulin lispro (ADMELOG) corrective regimen sliding scale   SubCutaneous at bedtime  lisinopril 40 milliGRAM(s) Oral with breakfast  QUEtiapine 25 milliGRAM(s) Oral at bedtime    MEDICATIONS  (PRN):  acetaminophen     Tablet .. 650 milliGRAM(s) Oral every 6 hours PRN Temp greater or equal to 38C (100.4F), Mild Pain (1 - 3)  aluminum hydroxide/magnesium hydroxide/simethicone Suspension 30 milliLiter(s) Oral every 4 hours PRN Dyspepsia  dextrose Oral Gel 15 Gram(s) Oral once PRN Blood Glucose LESS THAN 70 milliGRAM(s)/deciliter  melatonin 3 milliGRAM(s) Oral at bedtime PRN Insomnia  ondansetron Injectable 4 milliGRAM(s) IV Push every 8 hours PRN Nausea and/or Vomiting    CAPILLARY BLOOD GLUCOSE  POCT Blood Glucose.: 165 mg/dL (06 Nov 2024 11:29)  POCT Blood Glucose.: 157 mg/dL (06 Nov 2024 07:29)  POCT Blood Glucose.: 220 mg/dL (05 Nov 2024 22:15)  POCT Blood Glucose.: 247 mg/dL (05 Nov 2024 20:51)  POCT Blood Glucose.: 192 mg/dL (05 Nov 2024 16:07)    PHYSICAL EXAM:  Vital Signs Last 24 Hrs  T(C): 36.8 (06 Nov 2024 11:00), Max: 37 (05 Nov 2024 20:08)  T(F): 98.2 (06 Nov 2024 11:00), Max: 98.6 (05 Nov 2024 20:08)  HR: 76 (06 Nov 2024 11:00) (76 - 90)  BP: 149/87 (06 Nov 2024 11:00) (145/74 - 190/104)  BP(mean): 134 (05 Nov 2024 20:08) (134 - 134)  RR: 18 (06 Nov 2024 11:00) (16 - 18)  SpO2: 98% (06 Nov 2024 11:00) (98% - 100%)    Parameters below as of 06 Nov 2024 11:00  Patient On (Oxygen Delivery Method): room air    CONSTITUTIONAL: resting comfortably in bed,   RESPIRATORY: Normal respiratory effort; lungs are clear to auscultation bilaterally  CARDIOVASCULAR: Regular rate and rhythm, normal S1 and S2, no murmur/rub/gallop; No lower extremity edema; Peripheral pulses are 2+ bilaterally  ABDOMEN: Nontender to palpation, normoactive bowel sounds, no rebound/guarding  MUSCLOSKELETAL: no clubbing or cyanosis of digits; no joint swelling or tenderness to palpation  PSYCH: calm, oriented self, hospital  NEUROLOGY: CN 2-12 are intact and symmetric; no gross sensory deficits;   SKIN: No rashes; no palpable lesions    LABS:                        11.1   7.01  )-----------( 232      ( 06 Nov 2024 02:30 )             32.9     11-06    143  |  106  |  7   ----------------------------<  149[H]  4.0   |  24  |  1.02    Ca    8.9      06 Nov 2024 02:30  Phos  2.8     11-05    TPro  7.7  /  Alb  4.0  /  TBili  <0.2  /  DBili  x   /  AST  19  /  ALT  13  /  AlkPhos  63  11-05    RADIOLOGY & ADDITIONAL TESTS:  Results Reviewed:   Imaging Personally Reviewed:  Electrocardiogram Personally Reviewed:    COORDINATION OF CARE:  Care Discussed with Consultants/Other Providers [Y/N]: RN, SW, CM, ACP, Psych re overall care   Prior or Outpatient Records Reviewed [Y/N]:

## 2024-11-06 NOTE — BH CONSULTATION LIAISON PROGRESS NOTE - NSBHTIMEACTIVITIESPERFORMED_PSY_A_CORE
25 minutes spent on total encounter. The necessity of the time spent during the encounter on this date of service was due to:     I had a face to face encounter with this patient. I spent 25 total minutes on the bedside interview and examination, coordination of care, counseling, chart review, and documentation for this patient.  2

## 2024-11-06 NOTE — PROGRESS NOTE ADULT - PROBLEM SELECTOR PLAN 2
a/w /113  Continue amlodipine-benazapril 5-40mg daily formulary  Monitor a/w /113  Continue amlodipine-benazapril 5-40mg daily formulary  improved  Monitor

## 2024-11-06 NOTE — PROGRESS NOTE ADULT - PROBLEM SELECTOR PLAN 1
worsening aggression with agitation  UA/RVP/CXR wnl   consulted: "Would decrease Lexapro to 10 mg daily- inconsistent with home medication. Continue Seroquel 25 mg hs"  Case management consulted as daughter would like placement for the pt on d/c worsening aggression with agitation  UA/RVP/CXR wnl  appreciate psych input - decreased Lexapro to 10 mg daily; c/w Seroquel 25 mg hs; Zyprexa 1.25 q6 PRN agitation/not redirectable  Case management consulted as daughter would like placement for the pt on d/c

## 2024-11-06 NOTE — PROGRESS NOTE ADULT - ASSESSMENT
69F dementia, HTN, DM, a/w increased agitation and hypertensive urgency 69F dementia, h/o CVA 9/11/23 (subsequent L eye blindness), HTN, DM, a/w increased agitation and hypertensive urgency

## 2024-11-06 NOTE — BH CONSULTATION LIAISON PROGRESS NOTE - CURRENT MEDICATION
MEDICATIONS  (STANDING):  amLODIPine   Tablet 5 milliGRAM(s) Oral with breakfast  aspirin enteric coated 81 milliGRAM(s) Oral daily  atorvastatin 80 milliGRAM(s) Oral at bedtime  dextrose 5%. 1000 milliLiter(s) (100 mL/Hr) IV Continuous <Continuous>  dextrose 5%. 1000 milliLiter(s) (50 mL/Hr) IV Continuous <Continuous>  dextrose 50% Injectable 12.5 Gram(s) IV Push once  dextrose 50% Injectable 25 Gram(s) IV Push once  dextrose 50% Injectable 25 Gram(s) IV Push once  enoxaparin Injectable 40 milliGRAM(s) SubCutaneous every 24 hours  escitalopram 10 milliGRAM(s) Oral daily  glucagon  Injectable 1 milliGRAM(s) IntraMuscular once  influenza  Vaccine (HIGH DOSE) 0.5 milliLiter(s) IntraMuscular once  insulin lispro (ADMELOG) corrective regimen sliding scale   SubCutaneous three times a day before meals  insulin lispro (ADMELOG) corrective regimen sliding scale   SubCutaneous at bedtime  lisinopril 40 milliGRAM(s) Oral with breakfast  QUEtiapine 25 milliGRAM(s) Oral at bedtime    MEDICATIONS  (PRN):  acetaminophen     Tablet .. 650 milliGRAM(s) Oral every 6 hours PRN Temp greater or equal to 38C (100.4F), Mild Pain (1 - 3)  aluminum hydroxide/magnesium hydroxide/simethicone Suspension 30 milliLiter(s) Oral every 4 hours PRN Dyspepsia  dextrose Oral Gel 15 Gram(s) Oral once PRN Blood Glucose LESS THAN 70 milliGRAM(s)/deciliter  melatonin 3 milliGRAM(s) Oral at bedtime PRN Insomnia  ondansetron Injectable 4 milliGRAM(s) IV Push every 8 hours PRN Nausea and/or Vomiting

## 2024-11-06 NOTE — PHYSICAL THERAPY INITIAL EVALUATION ADULT - ADDITIONAL COMMENTS
Patient lives with daughter in private home, no steps to negotiate. Patient was independent in ADL prior to admission and was not using an assistive device prior to admission.

## 2024-11-06 NOTE — BH CONSULTATION LIAISON PROGRESS NOTE - NSBHCHARTREVIEWVS_PSY_A_CORE FT
Vital Signs Last 24 Hrs  T(C): 36.8 (06 Nov 2024 05:00), Max: 37 (05 Nov 2024 20:08)  T(F): 98.2 (06 Nov 2024 05:00), Max: 98.6 (05 Nov 2024 20:08)  HR: 82 (06 Nov 2024 05:00) (82 - 90)  BP: 145/74 (06 Nov 2024 05:00) (145/74 - 190/104)  BP(mean): 134 (05 Nov 2024 20:08) (134 - 134)  RR: 18 (06 Nov 2024 05:00) (16 - 18)  SpO2: 99% (06 Nov 2024 05:00) (99% - 100%)    Parameters below as of 06 Nov 2024 05:00  Patient On (Oxygen Delivery Method): room air

## 2024-11-06 NOTE — BH CONSULTATION LIAISON PROGRESS NOTE - NSBHCHARTREVIEWLAB_PSY_A_CORE FT
11.1   7.01  )-----------( 232      ( 06 Nov 2024 02:30 )             32.9   11-06    143  |  106  |  7   ----------------------------<  149[H]  4.0   |  24  |  1.02    Ca    8.9      06 Nov 2024 02:30  Phos  2.8     11-05    TPro  7.7  /  Alb  4.0  /  TBili  <0.2  /  DBili  x   /  AST  19  /  ALT  13  /  AlkPhos  63  11-05

## 2024-11-06 NOTE — BH CONSULTATION LIAISON PROGRESS NOTE - NSBHFUPINTERVALHXFT_PSY_A_CORE
patient was seen and assessed at bedside. patient is awake, alert. Aware she is in the hospital but uncertain which, does not know why she is here. States it is 2003. Patient is calm, pleasant, no agitation or aggression on the unit. Feels safe. Denies AH and VH on this exam and also reports she has no VH at home ( documentation from ED BH note states family noted VH at home).  no si or hi elicited. eating and sleeping well overnight.

## 2024-11-06 NOTE — PATIENT PROFILE ADULT - FALL HARM RISK - HARM RISK INTERVENTIONS

## 2024-11-06 NOTE — PROGRESS NOTE ADULT - NSPROGADDITIONALINFOA_GEN_ALL_CORE
d/w daughter [phone] - pt with h/o CVA 9/11/23 (subsequent L eye blindness), was at rehab in [facility] till January. Has had 24h caretaker who just quit this week bec pt more agitated. Having trouble caring for pt at home. Now wants LTC placement. SW to f/u.

## 2024-11-06 NOTE — BH CONSULTATION LIAISON PROGRESS NOTE - NSBHASSESSMENTFT_PSY_ALL_CORE
Patient is a 69 year old, female; domicile with family; ; noncaregiver; PPH of dementia; no hospitalizations; no known suicide attempts; no known history of violence or arrests; no active substance abuse or known history of complicated withdrawal; PMH of HTN and DM; brought in by EMS; called by daughter for worsening agitation and visual hallucinations.    PLAN  - patient is on 7s, defer level of observation to primary team  - decreased Lexapro to 10 mg daily- inconsistent with home medication  - Continue Seroquel 25 mg hs  -- antipsychotics can only be given if qtc < 500   - PRN for agitation: Zyprexa 1.25mg q6hrs  -- if with refractory agitation after 30 minutes can give an additional 1.25mg Zyprexa   - Sw to discuss placement with family as requested on admission

## 2024-11-07 LAB
GLUCOSE BLDC GLUCOMTR-MCNC: 163 MG/DL — HIGH (ref 70–99)
GLUCOSE BLDC GLUCOMTR-MCNC: 192 MG/DL — HIGH (ref 70–99)
GLUCOSE BLDC GLUCOMTR-MCNC: 213 MG/DL — HIGH (ref 70–99)
GLUCOSE BLDC GLUCOMTR-MCNC: 223 MG/DL — HIGH (ref 70–99)

## 2024-11-07 PROCEDURE — 99232 SBSQ HOSP IP/OBS MODERATE 35: CPT

## 2024-11-07 RX ADMIN — QUETIAPINE FUMARATE 25 MILLIGRAM(S): 200 TABLET ORAL at 21:14

## 2024-11-07 RX ADMIN — Medication 40 MILLIGRAM(S): at 08:51

## 2024-11-07 RX ADMIN — Medication 2: at 12:30

## 2024-11-07 RX ADMIN — Medication 81 MILLIGRAM(S): at 12:31

## 2024-11-07 RX ADMIN — Medication 5 MILLIGRAM(S): at 08:51

## 2024-11-07 RX ADMIN — Medication 2: at 16:40

## 2024-11-07 RX ADMIN — Medication 1: at 08:33

## 2024-11-07 RX ADMIN — Medication 80 MILLIGRAM(S): at 21:14

## 2024-11-07 RX ADMIN — ESCITALOPRAM 10 MILLIGRAM(S): 10 TABLET, FILM COATED ORAL at 12:31

## 2024-11-07 RX ADMIN — Medication 40 MILLIGRAM(S): at 17:11

## 2024-11-07 NOTE — PROGRESS NOTE ADULT - PROBLEM SELECTOR PLAN 2
a/w /113  Continue amlodipine-benazapril 5-40mg daily formulary  improved  Monitor PAST MEDICAL HISTORY:  No pertinent past medical history

## 2024-11-07 NOTE — PROGRESS NOTE ADULT - PROBLEM SELECTOR PLAN 1
worsening aggression with agitation  UA/RVP/CXR wnl  appreciate psych input - decreased Lexapro to 10 mg daily; c/w Seroquel 25 mg hs; Zyprexa 1.25 q6 PRN agitation/not redirectable  Case management consulted as daughter would like placement for the pt on d/c

## 2024-11-07 NOTE — PROGRESS NOTE ADULT - SUBJECTIVE AND OBJECTIVE BOX
[facility] Division of Hospital Medicine  Kristen Leach MD  Pager (M-F, 8A-5P):  In-house pager [phone]; Long-range pager [phone]  Other Times:  Please page Hospitalist in Charge -  In-house pager [phone]    Patient is a 69y old  Female who presents with a chief complaint of hypertensive urgency, increased agitation (06 Nov 2024 14:13)    SUBJECTIVE / OVERNIGHT EVENTS:  No problems reported over night.    ADDITIONAL REVIEW OF SYSTEMS:    MEDICATIONS  (STANDING):  amLODIPine   Tablet 5 milliGRAM(s) Oral with breakfast  aspirin enteric coated 81 milliGRAM(s) Oral daily  atorvastatin 80 milliGRAM(s) Oral at bedtime  dextrose 5%. 1000 milliLiter(s) (50 mL/Hr) IV Continuous <Continuous>  dextrose 5%. 1000 milliLiter(s) (100 mL/Hr) IV Continuous <Continuous>  dextrose 50% Injectable 25 Gram(s) IV Push once  dextrose 50% Injectable 12.5 Gram(s) IV Push once  dextrose 50% Injectable 25 Gram(s) IV Push once  enoxaparin Injectable 40 milliGRAM(s) SubCutaneous every 24 hours  escitalopram 10 milliGRAM(s) Oral daily  glucagon  Injectable 1 milliGRAM(s) IntraMuscular once  influenza  Vaccine (HIGH DOSE) 0.5 milliLiter(s) IntraMuscular once  insulin lispro (ADMELOG) corrective regimen sliding scale   SubCutaneous three times a day before meals  insulin lispro (ADMELOG) corrective regimen sliding scale   SubCutaneous at bedtime  lisinopril 40 milliGRAM(s) Oral with breakfast  QUEtiapine 25 milliGRAM(s) Oral at bedtime    MEDICATIONS  (PRN):  acetaminophen     Tablet .. 650 milliGRAM(s) Oral every 6 hours PRN Temp greater or equal to 38C (100.4F), Mild Pain (1 - 3)  aluminum hydroxide/magnesium hydroxide/simethicone Suspension 30 milliLiter(s) Oral every 4 hours PRN Dyspepsia  dextrose Oral Gel 15 Gram(s) Oral once PRN Blood Glucose LESS THAN 70 milliGRAM(s)/deciliter  melatonin 3 milliGRAM(s) Oral at bedtime PRN Insomnia  OLANZapine 1.25 milliGRAM(s) Oral every 6 hours PRN agitation, not redirectable  OLANZapine Injectable 1.25 milliGRAM(s) IntraMuscular every 6 hours PRN agitation, not redirectable  ondansetron Injectable 4 milliGRAM(s) IV Push every 8 hours PRN Nausea and/or Vomiting    CAPILLARY BLOOD GLUCOSE  POCT Blood Glucose.: 163 mg/dL (07 Nov 2024 07:34)  POCT Blood Glucose.: 205 mg/dL (06 Nov 2024 21:19)  POCT Blood Glucose.: 148 mg/dL (06 Nov 2024 16:32)  POCT Blood Glucose.: 165 mg/dL (06 Nov 2024 11:29)    PHYSICAL EXAM:  Vital Signs Last 24 Hrs  T(C): 36.4 (07 Nov 2024 04:30), Max: 36.8 (06 Nov 2024 11:00)  T(F): 97.6 (07 Nov 2024 04:30), Max: 98.2 (06 Nov 2024 11:00)  HR: 76 (07 Nov 2024 04:30) (76 - 78)  BP: 129/65 (07 Nov 2024 04:30) (129/65 - 149/87)  BP(mean): --  RR: 17 (07 Nov 2024 04:30) (17 - 18)  SpO2: 100% (07 Nov 2024 04:30) (98% - 100%)    Parameters below as of 07 Nov 2024 04:30  Patient On (Oxygen Delivery Method): room air    CONSTITUTIONAL: resting comfortably in bed,   RESPIRATORY: Normal respiratory effort; lungs are clear to auscultation bilaterally  CARDIOVASCULAR: Regular rate and rhythm, normal S1 and S2, no murmur/rub/gallop; No lower extremity edema; Peripheral pulses are 2+ bilaterally  ABDOMEN: Nontender to palpation, normoactive bowel sounds, no rebound/guarding  MUSCLOSKELETAL: no clubbing or cyanosis of digits; no joint swelling or tenderness to palpation  PSYCH: calm, oriented self, hospital  NEUROLOGY: CN 2-12 are intact and symmetric; no gross sensory deficits;   SKIN: No rashes; no palpable lesions    LABS:                        11.1   7.01  )-----------( 232      ( 06 Nov 2024 02:30 )             32.9     11-06    143  |  106  |  7   ----------------------------<  149[H]  4.0   |  24  |  1.02    Ca    8.9      06 Nov 2024 02:30  Phos  2.8     11-05    TPro  7.7  /  Alb  4.0  /  TBili  <0.2  /  DBili  x   /  AST  19  /  ALT  13  /  AlkPhos  63  11-05    RADIOLOGY & ADDITIONAL TESTS:  Results Reviewed:   Imaging Personally Reviewed:  Electrocardiogram Personally Reviewed:    COORDINATION OF CARE:  Care Discussed with Consultants/Other Providers [Y/N]: RN, SW, CM, ACP, Psych re overall care   Prior or Outpatient Records Reviewed [Y/N]:   [facility] Division of Hospital Medicine  Kristen Leach MD  Pager (M-F, 8A-5P):  In-house pager [phone]; Long-range pager [phone]  Other Times:  Please page Hospitalist in Charge -  In-house pager [phone]    Patient is a 69y old  Female who presents with a chief complaint of hypertensive urgency, increased agitation (06 Nov 2024 14:13)    SUBJECTIVE / OVERNIGHT EVENTS:  No problems reported over night.  Seen earlier, resting calmly in bed. No complaints.   ADDITIONAL REVIEW OF SYSTEMS:    MEDICATIONS  (STANDING):  amLODIPine   Tablet 5 milliGRAM(s) Oral with breakfast  aspirin enteric coated 81 milliGRAM(s) Oral daily  atorvastatin 80 milliGRAM(s) Oral at bedtime  dextrose 5%. 1000 milliLiter(s) (50 mL/Hr) IV Continuous <Continuous>  dextrose 5%. 1000 milliLiter(s) (100 mL/Hr) IV Continuous <Continuous>  dextrose 50% Injectable 25 Gram(s) IV Push once  dextrose 50% Injectable 12.5 Gram(s) IV Push once  dextrose 50% Injectable 25 Gram(s) IV Push once  enoxaparin Injectable 40 milliGRAM(s) SubCutaneous every 24 hours  escitalopram 10 milliGRAM(s) Oral daily  glucagon  Injectable 1 milliGRAM(s) IntraMuscular once  influenza  Vaccine (HIGH DOSE) 0.5 milliLiter(s) IntraMuscular once  insulin lispro (ADMELOG) corrective regimen sliding scale   SubCutaneous three times a day before meals  insulin lispro (ADMELOG) corrective regimen sliding scale   SubCutaneous at bedtime  lisinopril 40 milliGRAM(s) Oral with breakfast  QUEtiapine 25 milliGRAM(s) Oral at bedtime    MEDICATIONS  (PRN):  acetaminophen     Tablet .. 650 milliGRAM(s) Oral every 6 hours PRN Temp greater or equal to 38C (100.4F), Mild Pain (1 - 3)  aluminum hydroxide/magnesium hydroxide/simethicone Suspension 30 milliLiter(s) Oral every 4 hours PRN Dyspepsia  dextrose Oral Gel 15 Gram(s) Oral once PRN Blood Glucose LESS THAN 70 milliGRAM(s)/deciliter  melatonin 3 milliGRAM(s) Oral at bedtime PRN Insomnia  OLANZapine 1.25 milliGRAM(s) Oral every 6 hours PRN agitation, not redirectable  OLANZapine Injectable 1.25 milliGRAM(s) IntraMuscular every 6 hours PRN agitation, not redirectable  ondansetron Injectable 4 milliGRAM(s) IV Push every 8 hours PRN Nausea and/or Vomiting    CAPILLARY BLOOD GLUCOSE  POCT Blood Glucose.: 163 mg/dL (07 Nov 2024 07:34)  POCT Blood Glucose.: 205 mg/dL (06 Nov 2024 21:19)  POCT Blood Glucose.: 148 mg/dL (06 Nov 2024 16:32)  POCT Blood Glucose.: 165 mg/dL (06 Nov 2024 11:29)    PHYSICAL EXAM:  Vital Signs Last 24 Hrs  T(C): 36.4 (07 Nov 2024 04:30), Max: 36.8 (06 Nov 2024 11:00)  T(F): 97.6 (07 Nov 2024 04:30), Max: 98.2 (06 Nov 2024 11:00)  HR: 76 (07 Nov 2024 04:30) (76 - 78)  BP: 129/65 (07 Nov 2024 04:30) (129/65 - 149/87)  BP(mean): --  RR: 17 (07 Nov 2024 04:30) (17 - 18)  SpO2: 100% (07 Nov 2024 04:30) (98% - 100%)    Parameters below as of 07 Nov 2024 04:30  Patient On (Oxygen Delivery Method): room air    CONSTITUTIONAL: resting comfortably in bed,   RESPIRATORY: Normal respiratory effort; lungs are clear to auscultation bilaterally  CARDIOVASCULAR: Regular rate and rhythm, normal S1 and S2, no murmur/rub/gallop; No lower extremity edema; Peripheral pulses are 2+ bilaterally  ABDOMEN: Nontender to palpation, normoactive bowel sounds, no rebound/guarding  MUSCLOSKELETAL: no clubbing or cyanosis of digits; no joint swelling or tenderness to palpation  PSYCH: calm, oriented self, hospital  NEUROLOGY: CN 2-12 are intact and symmetric; no gross sensory deficits;   SKIN: No rashes; no palpable lesions    LABS:                        11.1   7.01  )-----------( 232      ( 06 Nov 2024 02:30 )             32.9     11-06    143  |  106  |  7   ----------------------------<  149[H]  4.0   |  24  |  1.02    Ca    8.9      06 Nov 2024 02:30  Phos  2.8     11-05    TPro  7.7  /  Alb  4.0  /  TBili  <0.2  /  DBili  x   /  AST  19  /  ALT  13  /  AlkPhos  63  11-05    RADIOLOGY & ADDITIONAL TESTS:  Results Reviewed:   Imaging Personally Reviewed:  Electrocardiogram Personally Reviewed:    COORDINATION OF CARE:  Care Discussed with Consultants/Other Providers [Y/N]: RN, SW, CM, ACP, Psych re overall care   Prior or Outpatient Records Reviewed [Y/N]:

## 2024-11-07 NOTE — PROGRESS NOTE ADULT - NSPROGADDITIONALINFOA_GEN_ALL_CORE
d/w daughter [phone] - pt with h/o CVA 9/11/23 (subsequent L eye blindness), was at rehab in [facility] till January. Has had 24h caretaker who just quit this week bec pt more agitated. Having trouble caring for pt at home. Now wants LTC placement. SW to f/u. 11/6 d/w daughter [phone] - pt with h/o CVA 9/11/23 (subsequent L eye blindness), was at rehab in [facility] till January. Has had 24h caretaker who just quit this week bec pt more agitated. Having trouble caring for pt at home. Now wants LTC placement. SW to f/u.

## 2024-11-07 NOTE — PROGRESS NOTE ADULT - ASSESSMENT
69F dementia, h/o CVA 9/11/23 (subsequent L eye blindness), HTN, DM, a/w increased agitation and hypertensive urgency

## 2024-11-08 LAB
GLUCOSE BLDC GLUCOMTR-MCNC: 142 MG/DL — HIGH (ref 70–99)
GLUCOSE BLDC GLUCOMTR-MCNC: 144 MG/DL — HIGH (ref 70–99)
GLUCOSE BLDC GLUCOMTR-MCNC: 214 MG/DL — HIGH (ref 70–99)
GLUCOSE BLDC GLUCOMTR-MCNC: 262 MG/DL — HIGH (ref 70–99)

## 2024-11-08 PROCEDURE — 99232 SBSQ HOSP IP/OBS MODERATE 35: CPT

## 2024-11-08 RX ADMIN — QUETIAPINE FUMARATE 25 MILLIGRAM(S): 200 TABLET ORAL at 21:31

## 2024-11-08 RX ADMIN — Medication 0: at 22:43

## 2024-11-08 RX ADMIN — Medication 40 MILLIGRAM(S): at 17:24

## 2024-11-08 RX ADMIN — Medication 5 MILLIGRAM(S): at 07:51

## 2024-11-08 RX ADMIN — Medication 3: at 11:57

## 2024-11-08 RX ADMIN — Medication 80 MILLIGRAM(S): at 21:31

## 2024-11-08 RX ADMIN — Medication 40 MILLIGRAM(S): at 07:51

## 2024-11-08 RX ADMIN — Medication 81 MILLIGRAM(S): at 11:58

## 2024-11-08 RX ADMIN — ESCITALOPRAM 10 MILLIGRAM(S): 10 TABLET, FILM COATED ORAL at 11:58

## 2024-11-08 NOTE — PROGRESS NOTE ADULT - PROBLEM SELECTOR PLAN 2
a/w /113  Continue amlodipine-benazapril 5-40mg daily formulary  improved  Monitor a/w /113  Continue amlodipine-benazapril 5-40mg daily formulary  stabilized

## 2024-11-08 NOTE — DISCHARGE NOTE PROVIDER - NSDCMRMEDTOKEN_GEN_ALL_CORE_FT
amlodipine-benazepril 5 mg-40 mg oral capsule: 1 cap(s) orally once a day  aspirin 81 mg oral tablet: 1 tab(s) orally once a day  atorvastatin 80 mg oral tablet: 1 tab(s) orally once a day  dapagliflozin 5 mg oral tablet: 1 tab(s) orally once a day  dulaglutide 3 mg/0.5 mL subcutaneous solution: 3 milligram(s) subcutaneously once a week  escitalopram 20 mg oral tablet: 1 tab(s) orally once a day  glipiZIDE 10 mg oral tablet: 1 tab(s) orally once a day  MetFORMIN (Eqv-Fortamet) 500 mg oral tablet, extended release: 1 tab(s) orally once a day (at bedtime)  QUEtiapine 25 mg oral tablet: 1 tab(s) orally once a day (at bedtime)   amLODIPine 5 mg oral tablet: 1 tab(s) orally once a day (before a meal)  aspirin 81 mg oral delayed release tablet: 1 tab(s) orally once a day  atorvastatin 80 mg oral tablet: 1 tab(s) orally once a day (at bedtime)  dulaglutide 3 mg/0.5 mL subcutaneous solution: 3 milligram(s) subcutaneously once a week  Lexapro 10 mg oral tablet: 1 tab(s) orally once a day  lisinopril 40 mg oral tablet: 1 tab(s) orally once a day (before a meal)  MetFORMIN (Eqv-Fortamet) 500 mg oral tablet, extended release: 1 tab(s) orally once a day (at bedtime)  QUEtiapine 25 mg oral tablet: 1 tab(s) orally once a day (at bedtime)

## 2024-11-08 NOTE — DISCHARGE NOTE PROVIDER - NSDCCPCAREPLAN_GEN_ALL_CORE_FT
PRINCIPAL DISCHARGE DIAGNOSIS  Diagnosis: Dementia with behavioral disturbance  Assessment and Plan of Treatment: The patient was admiited for agitation. Psychiatry evaluated, meds were adjusted. The patient has been calm since admission.      SECONDARY DISCHARGE DIAGNOSES  Diagnosis: Hypertensive urgency  Assessment and Plan of Treatment: The patient's bp was elevated on admission. She was given her home meds and it stabilized.    Diagnosis: T2DM (type 2 diabetes mellitus)  Assessment and Plan of Treatment: HbA1c 6.6. Home meds were held during admisison.   Resume on disharge........................................INCOMPLETE    Diagnosis: CVA (cerebrovascular accident)  Assessment and Plan of Treatment: The patient had a stroke in 9/2023 with resultant Left eye blindness. Continue aspirin and cholesterol medicine.    Diagnosis: Depression, unspecified depression type  Assessment and Plan of Treatment: Continue current med.     PRINCIPAL DISCHARGE DIAGNOSIS  Diagnosis: Dementia with behavioral disturbance  Assessment and Plan of Treatment: The patient was admiited for agitation. Psychiatry evaluated, meds were adjusted. The patient has been calm since admission.      SECONDARY DISCHARGE DIAGNOSES  Diagnosis: Hypertensive urgency  Assessment and Plan of Treatment: The patient's bp was elevated on admission. She was given her home meds and it stabilized.    Diagnosis: T2DM (type 2 diabetes mellitus)  Assessment and Plan of Treatment: HbA1c 6.6. Home meds were held during admisison.   Resume on disharge. Trulicity and metformin ER. Can hold glipizide    Diagnosis: CVA (cerebrovascular accident)  Assessment and Plan of Treatment: The patient had a stroke in 9/2023 with resultant Left eye blindness. Continue aspirin and cholesterol medicine.    Diagnosis: Depression, unspecified depression type  Assessment and Plan of Treatment: Continue current med.

## 2024-11-08 NOTE — PROGRESS NOTE ADULT - PROBLEM SELECTOR PLAN 4
Chronic stable  Continue atorvastatin 80mg daily  HDL 74,  Continue atorvastatin 80mg daily  HDL 74,

## 2024-11-08 NOTE — PROGRESS NOTE ADULT - PROBLEM SELECTOR PLAN 5
Chronic stable  Continue atorvastatin 80mg daily and asa 81mg daily Continue atorvastatin 80mg daily and asa 81mg daily

## 2024-11-08 NOTE — DISCHARGE NOTE PROVIDER - HOSPITAL COURSE
69F dementia, h/o CVA 9/11/23 (subsequent L eye blindness), HTN, DM, a/w increased agitation and hypertensive urgency. No clear infection, UA/RVP/CXR wnl. Seen by Psych - decreased Lexapro to 10 mg daily; c/w Seroquel 25 mg hs. Pt has been calm since admission, no PRNs required. Daughter requesting placement, SW working for safe d/c plan.     Hypertensive urgency. /113 on admission. Continued amlodipine-benazapril 5-40mg daily formulary. BP stabilized  T2DM (type 2 diabetes mellitus). A1c 6.6. Hold home Trulicity weekly, glipizide 10mg daily, metformin 500mg nightly. DM diet. Likely resume meds on d/c......  CVA 9/11/23 with resultant L eye blindness, HLD (hyperlipidemia). HDL 74, . C/w statin, ASA     69F dementia, h/o CVA 9/11/23 (subsequent L eye blindness), HTN, DM, a/w increased agitation and hypertensive urgency. No clear infection, UA/RVP/CXR wnl. Seen by Psych - decreased Lexapro to 10 mg daily; c/w Seroquel 25 mg hs. Pt has been calm since admission, no PRNs required. Daughter requesting placement, SW on board. Now placed at NH.      Hypertensive urgency. /113 on admission. Continued amlodipine-benazapril 5-40mg daily formulary. BP stabilized  T2DM (type 2 diabetes mellitus). A1c 6.6. Hold home Trulicity weekly, glipizide 10mg daily, metformin 500mg nightly. DM diet. Plan to resume trulicity and metformin on d/c  CVA 9/11/23 with resultant L eye blindness, HLD (hyperlipidemia). HDL 74, . C/w statin, ASA

## 2024-11-08 NOTE — PROGRESS NOTE ADULT - PROBLEM SELECTOR PLAN 1
worsening aggression with agitation  UA/RVP/CXR wnl  appreciate psych input - decreased Lexapro to 10 mg daily; c/w Seroquel 25 mg hs; Zyprexa 1.25 q6 PRN agitation/not redirectable  Case management consulted as daughter would like placement for the pt on d/c worsening aggression with agitation  UA/RVP/CXR wnl  appreciate psych input - decreased Lexapro to 10 mg daily; c/w Seroquel 25 mg hs; Zyprexa 1.25 q6 PRN agitation/not redirectable  Case management consulted as daughter would like placement for the pt on d/c  Pt has been calm since admission, no PRNs required.

## 2024-11-08 NOTE — PROGRESS NOTE ADULT - NSPROGADDITIONALINFOA_GEN_ALL_CORE
11/6 d/w daughter [phone] - pt with h/o CVA 9/11/23 (subsequent L eye blindness), was at rehab in [facility] till January. Has had 24h caretaker who just quit this week bec pt more agitated. Having trouble caring for pt at home. Now wants LTC placement. SW to f/u.

## 2024-11-08 NOTE — PROGRESS NOTE ADULT - SUBJECTIVE AND OBJECTIVE BOX
[facility] Division of Hospital Medicine  Kristen Leach MD  Pager (M-F, 8A-5P):  In-house pager [phone]; Long-range pager [phone]  Other Times:  Please page Hospitalist in Charge -  In-house pager [phone]    Patient is a 69y old  Female who presents with a chief complaint of hypertensive urgency, increased agitation (07 Nov 2024 08:31)    SUBJECTIVE / OVERNIGHT EVENTS:  No problems reported over night.    ADDITIONAL REVIEW OF SYSTEMS:    MEDICATIONS  (STANDING):  amLODIPine   Tablet 5 milliGRAM(s) Oral with breakfast  aspirin enteric coated 81 milliGRAM(s) Oral daily  atorvastatin 80 milliGRAM(s) Oral at bedtime  dextrose 5%. 1000 milliLiter(s) (50 mL/Hr) IV Continuous <Continuous>  dextrose 5%. 1000 milliLiter(s) (100 mL/Hr) IV Continuous <Continuous>  dextrose 50% Injectable 25 Gram(s) IV Push once  dextrose 50% Injectable 12.5 Gram(s) IV Push once  dextrose 50% Injectable 25 Gram(s) IV Push once  enoxaparin Injectable 40 milliGRAM(s) SubCutaneous every 24 hours  escitalopram 10 milliGRAM(s) Oral daily  glucagon  Injectable 1 milliGRAM(s) IntraMuscular once  influenza  Vaccine (HIGH DOSE) 0.5 milliLiter(s) IntraMuscular once  insulin lispro (ADMELOG) corrective regimen sliding scale   SubCutaneous three times a day before meals  insulin lispro (ADMELOG) corrective regimen sliding scale   SubCutaneous at bedtime  lisinopril 40 milliGRAM(s) Oral with breakfast  QUEtiapine 25 milliGRAM(s) Oral at bedtime    MEDICATIONS  (PRN):  acetaminophen     Tablet .. 650 milliGRAM(s) Oral every 6 hours PRN Temp greater or equal to 38C (100.4F), Mild Pain (1 - 3)  aluminum hydroxide/magnesium hydroxide/simethicone Suspension 30 milliLiter(s) Oral every 4 hours PRN Dyspepsia  dextrose Oral Gel 15 Gram(s) Oral once PRN Blood Glucose LESS THAN 70 milliGRAM(s)/deciliter  melatonin 3 milliGRAM(s) Oral at bedtime PRN Insomnia  OLANZapine 1.25 milliGRAM(s) Oral every 6 hours PRN agitation, not redirectable  OLANZapine Injectable 1.25 milliGRAM(s) IntraMuscular every 6 hours PRN agitation, not redirectable  ondansetron Injectable 4 milliGRAM(s) IV Push every 8 hours PRN Nausea and/or Vomiting    CAPILLARY BLOOD GLUCOSE  POCT Blood Glucose.: 142 mg/dL (08 Nov 2024 07:22)  POCT Blood Glucose.: 192 mg/dL (07 Nov 2024 21:08)  POCT Blood Glucose.: 223 mg/dL (07 Nov 2024 16:24)  POCT Blood Glucose.: 213 mg/dL (07 Nov 2024 11:44)    PHYSICAL EXAM:  Vital Signs Last 24 Hrs  T(C): 36.7 (08 Nov 2024 05:00), Max: 37.1 (07 Nov 2024 11:08)  T(F): 98 (08 Nov 2024 05:00), Max: 98.8 (07 Nov 2024 11:08)  HR: 76 (08 Nov 2024 05:00) (76 - 88)  BP: 147/77 (08 Nov 2024 05:00) (136/74 - 147/77)  BP(mean): --  RR: 18 (08 Nov 2024 05:00) (18 - 18)  SpO2: 97% (08 Nov 2024 05:00) (97% - 100%)    Parameters below as of 08 Nov 2024 05:00  Patient On (Oxygen Delivery Method): room air    CONSTITUTIONAL: resting comfortably in bed,   RESPIRATORY: Normal respiratory effort; lungs are clear to auscultation bilaterally  CARDIOVASCULAR: Regular rate and rhythm, normal S1 and S2, no murmur/rub/gallop; No lower extremity edema; Peripheral pulses are 2+ bilaterally  ABDOMEN: Nontender to palpation, normoactive bowel sounds, no rebound/guarding  MUSCLOSKELETAL: no clubbing or cyanosis of digits; no joint swelling or tenderness to palpation  PSYCH: calm, oriented self, hospital  NEUROLOGY: CN 2-12 are intact and symmetric; no gross sensory deficits;   SKIN: No rashes; no palpable lesions    LABS:    RADIOLOGY & ADDITIONAL TESTS:  Results Reviewed:   Imaging Personally Reviewed:  Electrocardiogram Personally Reviewed:    COORDINATION OF CARE:  Care Discussed with Consultants/Other Providers [Y/N]: RN, SW, CM, ACP, Psych re overall care   Prior or Outpatient Records Reviewed [Y/N]:   [facility] Division of Hospital Medicine  Kristen Leach MD  Pager (M-F, 8A-5P):  In-house pager [phone]; Long-range pager [phone]  Other Times:  Please page Hospitalist in Charge -  In-house pager [phone]    Patient is a 69y old  Female who presents with a chief complaint of hypertensive urgency, increased agitation (07 Nov 2024 08:31)    SUBJECTIVE / OVERNIGHT EVENTS:  No problems reported over night.  Seen earlier, resting calmly. No complaints.   ADDITIONAL REVIEW OF SYSTEMS:    MEDICATIONS  (STANDING):  amLODIPine   Tablet 5 milliGRAM(s) Oral with breakfast  aspirin enteric coated 81 milliGRAM(s) Oral daily  atorvastatin 80 milliGRAM(s) Oral at bedtime  dextrose 5%. 1000 milliLiter(s) (50 mL/Hr) IV Continuous <Continuous>  dextrose 5%. 1000 milliLiter(s) (100 mL/Hr) IV Continuous <Continuous>  dextrose 50% Injectable 25 Gram(s) IV Push once  dextrose 50% Injectable 12.5 Gram(s) IV Push once  dextrose 50% Injectable 25 Gram(s) IV Push once  enoxaparin Injectable 40 milliGRAM(s) SubCutaneous every 24 hours  escitalopram 10 milliGRAM(s) Oral daily  glucagon  Injectable 1 milliGRAM(s) IntraMuscular once  influenza  Vaccine (HIGH DOSE) 0.5 milliLiter(s) IntraMuscular once  insulin lispro (ADMELOG) corrective regimen sliding scale   SubCutaneous three times a day before meals  insulin lispro (ADMELOG) corrective regimen sliding scale   SubCutaneous at bedtime  lisinopril 40 milliGRAM(s) Oral with breakfast  QUEtiapine 25 milliGRAM(s) Oral at bedtime    MEDICATIONS  (PRN):  acetaminophen     Tablet .. 650 milliGRAM(s) Oral every 6 hours PRN Temp greater or equal to 38C (100.4F), Mild Pain (1 - 3)  aluminum hydroxide/magnesium hydroxide/simethicone Suspension 30 milliLiter(s) Oral every 4 hours PRN Dyspepsia  dextrose Oral Gel 15 Gram(s) Oral once PRN Blood Glucose LESS THAN 70 milliGRAM(s)/deciliter  melatonin 3 milliGRAM(s) Oral at bedtime PRN Insomnia  OLANZapine 1.25 milliGRAM(s) Oral every 6 hours PRN agitation, not redirectable  OLANZapine Injectable 1.25 milliGRAM(s) IntraMuscular every 6 hours PRN agitation, not redirectable  ondansetron Injectable 4 milliGRAM(s) IV Push every 8 hours PRN Nausea and/or Vomiting    CAPILLARY BLOOD GLUCOSE  POCT Blood Glucose.: 142 mg/dL (08 Nov 2024 07:22)  POCT Blood Glucose.: 192 mg/dL (07 Nov 2024 21:08)  POCT Blood Glucose.: 223 mg/dL (07 Nov 2024 16:24)  POCT Blood Glucose.: 213 mg/dL (07 Nov 2024 11:44)    PHYSICAL EXAM:  Vital Signs Last 24 Hrs  T(C): 36.7 (08 Nov 2024 05:00), Max: 37.1 (07 Nov 2024 11:08)  T(F): 98 (08 Nov 2024 05:00), Max: 98.8 (07 Nov 2024 11:08)  HR: 76 (08 Nov 2024 05:00) (76 - 88)  BP: 147/77 (08 Nov 2024 05:00) (136/74 - 147/77)  BP(mean): --  RR: 18 (08 Nov 2024 05:00) (18 - 18)  SpO2: 97% (08 Nov 2024 05:00) (97% - 100%)    Parameters below as of 08 Nov 2024 05:00  Patient On (Oxygen Delivery Method): room air    CONSTITUTIONAL: resting comfortably in bed,   RESPIRATORY: Normal respiratory effort; lungs are clear to auscultation bilaterally  CARDIOVASCULAR: Regular rate and rhythm, normal S1 and S2, no murmur/rub/gallop; No lower extremity edema  ABDOMEN: Nontender to palpation, normoactive bowel sounds, no rebound/guarding  MUSCLOSKELETAL: no clubbing or cyanosis of digits; no joint swelling or tenderness to palpation  PSYCH: calm, oriented self, hospital  NEUROLOGY: CN 2-12 are intact and symmetric; no gross sensory deficits;   SKIN: No rashes; no palpable lesions    LABS:    RADIOLOGY & ADDITIONAL TESTS:  Results Reviewed:   Imaging Personally Reviewed:  Electrocardiogram Personally Reviewed:    COORDINATION OF CARE:  Care Discussed with Consultants/Other Providers [Y/N]: RN, SW, CM, ACP, Psych re overall care   Prior or Outpatient Records Reviewed [Y/N]:

## 2024-11-08 NOTE — PROGRESS NOTE ADULT - ASSESSMENT
69F dementia, h/o CVA 9/11/23 (subsequent L eye blindness), HTN, DM, a/w increased agitation and hypertensive urgency 69F dementia, h/o CVA 9/11/23 (subsequent L eye blindness), HTN, DM, a/w increased agitation and hypertensive urgency  Been calm since admission

## 2024-11-09 LAB
GLUCOSE BLDC GLUCOMTR-MCNC: 167 MG/DL — HIGH (ref 70–99)
GLUCOSE BLDC GLUCOMTR-MCNC: 182 MG/DL — HIGH (ref 70–99)
GLUCOSE BLDC GLUCOMTR-MCNC: 185 MG/DL — HIGH (ref 70–99)
GLUCOSE BLDC GLUCOMTR-MCNC: 191 MG/DL — HIGH (ref 70–99)
GLUCOSE BLDC GLUCOMTR-MCNC: 254 MG/DL — HIGH (ref 70–99)
GLUCOSE BLDC GLUCOMTR-MCNC: 279 MG/DL — HIGH (ref 70–99)

## 2024-11-09 PROCEDURE — 99232 SBSQ HOSP IP/OBS MODERATE 35: CPT

## 2024-11-09 RX ADMIN — Medication 80 MILLIGRAM(S): at 21:28

## 2024-11-09 RX ADMIN — Medication 81 MILLIGRAM(S): at 11:46

## 2024-11-09 RX ADMIN — Medication 1: at 16:46

## 2024-11-09 RX ADMIN — Medication 40 MILLIGRAM(S): at 08:08

## 2024-11-09 RX ADMIN — Medication 5 MILLIGRAM(S): at 08:08

## 2024-11-09 RX ADMIN — ESCITALOPRAM 10 MILLIGRAM(S): 10 TABLET, FILM COATED ORAL at 11:46

## 2024-11-09 RX ADMIN — Medication 1: at 08:08

## 2024-11-09 RX ADMIN — Medication 1: at 22:01

## 2024-11-09 RX ADMIN — Medication 1: at 11:46

## 2024-11-09 RX ADMIN — QUETIAPINE FUMARATE 25 MILLIGRAM(S): 200 TABLET ORAL at 21:28

## 2024-11-09 RX ADMIN — Medication 40 MILLIGRAM(S): at 16:46

## 2024-11-09 NOTE — PROGRESS NOTE ADULT - SUBJECTIVE AND OBJECTIVE BOX
Division of Hospital Medicine  Terrance Morgan MD  Available on [employer] Teams    Patient is a 69y old  Female who presents with a chief complaint of hypertensive urgency, increased agitation (08 Nov 2024 16:09)      SUBJECTIVE / OVERNIGHT EVENTS:  Pleasant, cooperative, no acute c/o     MEDICATIONS  (STANDING):  amLODIPine   Tablet 5 milliGRAM(s) Oral with breakfast  aspirin enteric coated 81 milliGRAM(s) Oral daily  atorvastatin 80 milliGRAM(s) Oral at bedtime  dextrose 5%. 1000 milliLiter(s) (50 mL/Hr) IV Continuous <Continuous>  dextrose 5%. 1000 milliLiter(s) (100 mL/Hr) IV Continuous <Continuous>  dextrose 50% Injectable 25 Gram(s) IV Push once  dextrose 50% Injectable 12.5 Gram(s) IV Push once  dextrose 50% Injectable 25 Gram(s) IV Push once  enoxaparin Injectable 40 milliGRAM(s) SubCutaneous every 24 hours  escitalopram 10 milliGRAM(s) Oral daily  glucagon  Injectable 1 milliGRAM(s) IntraMuscular once  influenza  Vaccine (HIGH DOSE) 0.5 milliLiter(s) IntraMuscular once  insulin lispro (ADMELOG) corrective regimen sliding scale   SubCutaneous three times a day before meals  insulin lispro (ADMELOG) corrective regimen sliding scale   SubCutaneous at bedtime  lisinopril 40 milliGRAM(s) Oral with breakfast  QUEtiapine 25 milliGRAM(s) Oral at bedtime    MEDICATIONS  (PRN):  acetaminophen     Tablet .. 650 milliGRAM(s) Oral every 6 hours PRN Temp greater or equal to 38C (100.4F), Mild Pain (1 - 3)  aluminum hydroxide/magnesium hydroxide/simethicone Suspension 30 milliLiter(s) Oral every 4 hours PRN Dyspepsia  dextrose Oral Gel 15 Gram(s) Oral once PRN Blood Glucose LESS THAN 70 milliGRAM(s)/deciliter  melatonin 3 milliGRAM(s) Oral at bedtime PRN Insomnia  OLANZapine 1.25 milliGRAM(s) Oral every 6 hours PRN agitation, not redirectable  OLANZapine Injectable 1.25 milliGRAM(s) IntraMuscular every 6 hours PRN agitation, not redirectable  ondansetron Injectable 4 milliGRAM(s) IV Push every 8 hours PRN Nausea and/or Vomiting      CAPILLARY BLOOD GLUCOSE      POCT Blood Glucose.: 182 mg/dL (09 Nov 2024 16:45)  POCT Blood Glucose.: 191 mg/dL (09 Nov 2024 16:34)  POCT Blood Glucose.: 185 mg/dL (09 Nov 2024 11:32)  POCT Blood Glucose.: 167 mg/dL (09 Nov 2024 07:24)  POCT Blood Glucose.: 214 mg/dL (08 Nov 2024 22:08)    I&O's Summary      PHYSICAL EXAM:  Vital Signs Last 24 Hrs  T(C): 36.8 (09 Nov 2024 11:23), Max: 36.8 (08 Nov 2024 19:53)  T(F): 98.3 (09 Nov 2024 11:23), Max: 98.3 (09 Nov 2024 11:23)  HR: 80 (09 Nov 2024 11:23) (73 - 92)  BP: 131/82 (09 Nov 2024 11:23) (131/82 - 154/92)  BP(mean): --  RR: 18 (09 Nov 2024 11:23) (18 - 18)  SpO2: 100% (09 Nov 2024 11:23) (97% - 100%)    Parameters below as of 09 Nov 2024 11:23  Patient On (Oxygen Delivery Method): room air      CONSTITUTIONAL: NAD  EYES: PERRLA; conjunctiva and sclera clear  ENMT: MMM  RESPIRATORY: Normal respiratory effort; CTABL   CARDIOVASCULAR: Normal S1 and S2, RRR  ABDOMEN: Soft NTND,   MUSCLOSKELETAL:  moves all extremities spontaneously   PSYCH: A+O x2 (person, place); cooperative, calm       LABS:

## 2024-11-09 NOTE — PROGRESS NOTE ADULT - ASSESSMENT
69F dementia, h/o CVA 9/11/23 (subsequent L eye blindness), HTN, DM, a/w increased agitation and hypertensive urgency  Been calm since admission

## 2024-11-09 NOTE — PROGRESS NOTE ADULT - NSPROGADDITIONALINFOA_GEN_ALL_CORE
11/6 d/w daughter [phone] - pt with h/o CVA 9/11/23 (subsequent L eye blindness), was at rehab in [facility] till January. Has had 24h caretaker who just quit this week bec pt more agitated. Having trouble caring for pt at home. Now wants LTC placement. SW to f/u.

## 2024-11-09 NOTE — PROGRESS NOTE ADULT - PROBLEM SELECTOR PLAN 1
worsening aggression with agitation  UA/RVP/CXR wnl  appreciate psych input - decreased Lexapro to 10 mg daily; c/w Seroquel 25 mg hs; Zyprexa 1.25 q6 PRN   Case management consulted as daughter would like placement for the pt on d/c  Pt has been calm since admission, no PRNs required.

## 2024-11-10 LAB
GLUCOSE BLDC GLUCOMTR-MCNC: 179 MG/DL — HIGH (ref 70–99)
GLUCOSE BLDC GLUCOMTR-MCNC: 188 MG/DL — HIGH (ref 70–99)
GLUCOSE BLDC GLUCOMTR-MCNC: 188 MG/DL — HIGH (ref 70–99)
GLUCOSE BLDC GLUCOMTR-MCNC: 199 MG/DL — HIGH (ref 70–99)

## 2024-11-10 PROCEDURE — 99232 SBSQ HOSP IP/OBS MODERATE 35: CPT

## 2024-11-10 RX ADMIN — Medication 5 MILLIGRAM(S): at 07:51

## 2024-11-10 RX ADMIN — Medication 1: at 16:28

## 2024-11-10 RX ADMIN — Medication 40 MILLIGRAM(S): at 18:26

## 2024-11-10 RX ADMIN — QUETIAPINE FUMARATE 25 MILLIGRAM(S): 200 TABLET ORAL at 22:09

## 2024-11-10 RX ADMIN — Medication 1: at 07:46

## 2024-11-10 RX ADMIN — Medication 40 MILLIGRAM(S): at 07:51

## 2024-11-10 RX ADMIN — Medication 1: at 11:27

## 2024-11-10 RX ADMIN — ESCITALOPRAM 10 MILLIGRAM(S): 10 TABLET, FILM COATED ORAL at 11:30

## 2024-11-10 RX ADMIN — Medication 81 MILLIGRAM(S): at 11:30

## 2024-11-10 RX ADMIN — Medication 80 MILLIGRAM(S): at 22:10

## 2024-11-10 NOTE — PROGRESS NOTE ADULT - NSPROGADDITIONALINFOA_GEN_ALL_CORE
11/6 d/w daughter [phone] - pt with h/o CVA 9/11/23 (subsequent L eye blindness), was at rehab in [facility] till January. Has had 24h caretaker who just quit this week b/c pt more agitated. Having trouble caring for pt at home. Now wants LTC placement. SW to f/u.

## 2024-11-10 NOTE — PROGRESS NOTE ADULT - PROBLEM SELECTOR PLAN 1
worsening aggression with agitation  UA/RVP/CXR wnl  appreciate psych input - decreased Lexapro to 10 mg daily; c/w Seroquel 25 mg hs; Zyprexa 1.25 q6 PRN   Case management consulted as daughter would like placement for the pt on d/c  Pt has been calm since admission, not needing PRNs

## 2024-11-10 NOTE — PROGRESS NOTE ADULT - SUBJECTIVE AND OBJECTIVE BOX
Division of Hospital Medicine  Terrance Morgan MD  Available on [employer] Teams    Patient is a 69y old  Female who presents with a chief complaint of hypertensive urgency, increased agitation (09 Nov 2024 17:35)      SUBJECTIVE / OVERNIGHT EVENTS:  Examined at bedside, listening to music. In pleasant mood, no c/o.    MEDICATIONS  (STANDING):  amLODIPine   Tablet 5 milliGRAM(s) Oral with breakfast  aspirin enteric coated 81 milliGRAM(s) Oral daily  atorvastatin 80 milliGRAM(s) Oral at bedtime  dextrose 5%. 1000 milliLiter(s) (50 mL/Hr) IV Continuous <Continuous>  dextrose 5%. 1000 milliLiter(s) (100 mL/Hr) IV Continuous <Continuous>  dextrose 50% Injectable 25 Gram(s) IV Push once  dextrose 50% Injectable 12.5 Gram(s) IV Push once  dextrose 50% Injectable 25 Gram(s) IV Push once  enoxaparin Injectable 40 milliGRAM(s) SubCutaneous every 24 hours  escitalopram 10 milliGRAM(s) Oral daily  glucagon  Injectable 1 milliGRAM(s) IntraMuscular once  influenza  Vaccine (HIGH DOSE) 0.5 milliLiter(s) IntraMuscular once  insulin lispro (ADMELOG) corrective regimen sliding scale   SubCutaneous three times a day before meals  insulin lispro (ADMELOG) corrective regimen sliding scale   SubCutaneous at bedtime  lisinopril 40 milliGRAM(s) Oral with breakfast  QUEtiapine 25 milliGRAM(s) Oral at bedtime    MEDICATIONS  (PRN):  acetaminophen     Tablet .. 650 milliGRAM(s) Oral every 6 hours PRN Temp greater or equal to 38C (100.4F), Mild Pain (1 - 3)  aluminum hydroxide/magnesium hydroxide/simethicone Suspension 30 milliLiter(s) Oral every 4 hours PRN Dyspepsia  dextrose Oral Gel 15 Gram(s) Oral once PRN Blood Glucose LESS THAN 70 milliGRAM(s)/deciliter  melatonin 3 milliGRAM(s) Oral at bedtime PRN Insomnia  OLANZapine 1.25 milliGRAM(s) Oral every 6 hours PRN agitation, not redirectable  OLANZapine Injectable 1.25 milliGRAM(s) IntraMuscular every 6 hours PRN agitation, not redirectable  ondansetron Injectable 4 milliGRAM(s) IV Push every 8 hours PRN Nausea and/or Vomiting      CAPILLARY BLOOD GLUCOSE      POCT Blood Glucose.: 188 mg/dL (10 Nov 2024 11:16)  POCT Blood Glucose.: 179 mg/dL (10 Nov 2024 07:35)  POCT Blood Glucose.: 279 mg/dL (09 Nov 2024 22:00)  POCT Blood Glucose.: 254 mg/dL (09 Nov 2024 20:49)  POCT Blood Glucose.: 182 mg/dL (09 Nov 2024 16:45)  POCT Blood Glucose.: 191 mg/dL (09 Nov 2024 16:34)    I&O's Summary    09 Nov 2024 07:01  -  10 Nov 2024 07:00  --------------------------------------------------------  IN: 0 mL / OUT: 1000 mL / NET: -1000 mL        PHYSICAL EXAM:  Vital Signs Last 24 Hrs  T(C): 36.7 (10 Nov 2024 11:05), Max: 36.9 (09 Nov 2024 21:17)  T(F): 98.1 (10 Nov 2024 11:05), Max: 98.4 (09 Nov 2024 21:17)  HR: 85 (10 Nov 2024 11:05) (78 - 85)  BP: 129/72 (10 Nov 2024 11:05) (129/72 - 143/72)  BP(mean): --  RR: 18 (10 Nov 2024 11:05) (18 - 18)  SpO2: 100% (10 Nov 2024 11:05) (100% - 100%)    Parameters below as of 10 Nov 2024 11:05  Patient On (Oxygen Delivery Method): room air      CONSTITUTIONAL: NAD  EYES: PERRLA; conjunctiva and sclera clear  ENMT: MMM  RESPIRATORY: Normal respiratory effort; CTABL   CARDIOVASCULAR: Normal S1 and S2, RRR  ABDOMEN: Soft NTND  MUSCLOSKELETAL:  moves all extremities spontaneously   PSYCH: A+O x2 (person, place); cooperative, calm    LABS:

## 2024-11-11 LAB
GLUCOSE BLDC GLUCOMTR-MCNC: 144 MG/DL — HIGH (ref 70–99)
GLUCOSE BLDC GLUCOMTR-MCNC: 175 MG/DL — HIGH (ref 70–99)
GLUCOSE BLDC GLUCOMTR-MCNC: 224 MG/DL — HIGH (ref 70–99)
GLUCOSE BLDC GLUCOMTR-MCNC: 238 MG/DL — HIGH (ref 70–99)

## 2024-11-11 PROCEDURE — 99232 SBSQ HOSP IP/OBS MODERATE 35: CPT

## 2024-11-11 RX ADMIN — ESCITALOPRAM 10 MILLIGRAM(S): 10 TABLET, FILM COATED ORAL at 11:26

## 2024-11-11 RX ADMIN — Medication 40 MILLIGRAM(S): at 08:08

## 2024-11-11 RX ADMIN — QUETIAPINE FUMARATE 25 MILLIGRAM(S): 200 TABLET ORAL at 21:11

## 2024-11-11 RX ADMIN — Medication 2: at 11:26

## 2024-11-11 RX ADMIN — Medication 40 MILLIGRAM(S): at 17:13

## 2024-11-11 RX ADMIN — Medication 80 MILLIGRAM(S): at 21:11

## 2024-11-11 RX ADMIN — Medication 81 MILLIGRAM(S): at 11:26

## 2024-11-11 RX ADMIN — Medication 5 MILLIGRAM(S): at 08:08

## 2024-11-11 RX ADMIN — Medication 1: at 08:08

## 2024-11-11 NOTE — PROGRESS NOTE ADULT - SUBJECTIVE AND OBJECTIVE BOX
PROGRESS NOTE:   Authored by Dr. Alondra Lundy MD, pager [phone]     Patient is a 69y old  Female who presents with a chief complaint of hypertensive urgency, increased agitation (10 Nov 2024 16:15)      SUBJECTIVE / OVERNIGHT EVENTS:  Pt is sitting up in bed. Doing well. Taking PO. No complaints    ADDITIONAL REVIEW OF SYSTEMS:    MEDICATIONS  (STANDING):  amLODIPine   Tablet 5 milliGRAM(s) Oral with breakfast  aspirin enteric coated 81 milliGRAM(s) Oral daily  atorvastatin 80 milliGRAM(s) Oral at bedtime  dextrose 5%. 1000 milliLiter(s) (100 mL/Hr) IV Continuous <Continuous>  dextrose 5%. 1000 milliLiter(s) (50 mL/Hr) IV Continuous <Continuous>  dextrose 50% Injectable 12.5 Gram(s) IV Push once  dextrose 50% Injectable 25 Gram(s) IV Push once  dextrose 50% Injectable 25 Gram(s) IV Push once  enoxaparin Injectable 40 milliGRAM(s) SubCutaneous every 24 hours  escitalopram 10 milliGRAM(s) Oral daily  glucagon  Injectable 1 milliGRAM(s) IntraMuscular once  influenza  Vaccine (HIGH DOSE) 0.5 milliLiter(s) IntraMuscular once  insulin lispro (ADMELOG) corrective regimen sliding scale   SubCutaneous three times a day before meals  insulin lispro (ADMELOG) corrective regimen sliding scale   SubCutaneous at bedtime  lisinopril 40 milliGRAM(s) Oral with breakfast  QUEtiapine 25 milliGRAM(s) Oral at bedtime    MEDICATIONS  (PRN):  acetaminophen     Tablet .. 650 milliGRAM(s) Oral every 6 hours PRN Temp greater or equal to 38C (100.4F), Mild Pain (1 - 3)  aluminum hydroxide/magnesium hydroxide/simethicone Suspension 30 milliLiter(s) Oral every 4 hours PRN Dyspepsia  dextrose Oral Gel 15 Gram(s) Oral once PRN Blood Glucose LESS THAN 70 milliGRAM(s)/deciliter  melatonin 3 milliGRAM(s) Oral at bedtime PRN Insomnia  OLANZapine 1.25 milliGRAM(s) Oral every 6 hours PRN agitation, not redirectable  OLANZapine Injectable 1.25 milliGRAM(s) IntraMuscular every 6 hours PRN agitation, not redirectable  ondansetron Injectable 4 milliGRAM(s) IV Push every 8 hours PRN Nausea and/or Vomiting      CAPILLARY BLOOD GLUCOSE      POCT Blood Glucose.: 224 mg/dL (11 Nov 2024 11:11)  POCT Blood Glucose.: 175 mg/dL (11 Nov 2024 07:48)  POCT Blood Glucose.: 199 mg/dL (10 Nov 2024 21:48)  POCT Blood Glucose.: 188 mg/dL (10 Nov 2024 16:25)    I&O's Summary      PHYSICAL EXAM:  Vital Signs Last 24 Hrs  T(C): 36.9 (11 Nov 2024 11:07), Max: 36.9 (10 Nov 2024 20:18)  T(F): 98.4 (11 Nov 2024 11:07), Max: 98.5 (10 Nov 2024 20:18)  HR: 86 (11 Nov 2024 11:07) (81 - 96)  BP: 150/73 (11 Nov 2024 11:07) (133/64 - 154/80)  BP(mean): --  RR: 18 (11 Nov 2024 11:07) (18 - 18)  SpO2: 100% (11 Nov 2024 11:07) (100% - 100%)    Parameters below as of 11 Nov 2024 11:07  Patient On (Oxygen Delivery Method): room air        CONSTITUTIONAL: NAD, well-developed  RESPIRATORY: Normal respiratory effort; lungs are clear to auscultation bilaterally  CARDIOVASCULAR: Regular rate and rhythm, normal S1 and S2, no murmur/rub/gallop; No lower extremity edema; Peripheral pulses are 2+ bilaterally  ABDOMEN: Nontender to palpation, normoactive bowel sounds, no rebound/guarding; No hepatosplenomegaly  MUSCULOSKELETAL: no clubbing or cyanosis of digits; no joint swelling or tenderness to palpation  PSYCH: Alert and calm    LABS:                      RADIOLOGY & ADDITIONAL TESTS:  Results Reviewed:   Imaging Personally Reviewed:  Electrocardiogram Personally Reviewed:    COORDINATION OF CARE:  Care Discussed with Consultants/Other Providers [Y/N]:  Prior or Outpatient Records Reviewed [Y/N]:   PROGRESS NOTE:   Authored by Dr. Alondra Lundy MD, pager [phone]     Patient is a 69y old  Female who presents with a chief complaint of hypertensive urgency, increased agitation (10 Nov 2024 16:15)      SUBJECTIVE / OVERNIGHT EVENTS:  Pt is sitting up in bed. Doing well. Taking PO. No complaints.    ADDITIONAL REVIEW OF SYSTEMS:    MEDICATIONS  (STANDING):  amLODIPine   Tablet 5 milliGRAM(s) Oral with breakfast  aspirin enteric coated 81 milliGRAM(s) Oral daily  atorvastatin 80 milliGRAM(s) Oral at bedtime  dextrose 5%. 1000 milliLiter(s) (100 mL/Hr) IV Continuous <Continuous>  dextrose 5%. 1000 milliLiter(s) (50 mL/Hr) IV Continuous <Continuous>  dextrose 50% Injectable 12.5 Gram(s) IV Push once  dextrose 50% Injectable 25 Gram(s) IV Push once  dextrose 50% Injectable 25 Gram(s) IV Push once  enoxaparin Injectable 40 milliGRAM(s) SubCutaneous every 24 hours  escitalopram 10 milliGRAM(s) Oral daily  glucagon  Injectable 1 milliGRAM(s) IntraMuscular once  influenza  Vaccine (HIGH DOSE) 0.5 milliLiter(s) IntraMuscular once  insulin lispro (ADMELOG) corrective regimen sliding scale   SubCutaneous three times a day before meals  insulin lispro (ADMELOG) corrective regimen sliding scale   SubCutaneous at bedtime  lisinopril 40 milliGRAM(s) Oral with breakfast  QUEtiapine 25 milliGRAM(s) Oral at bedtime    MEDICATIONS  (PRN):  acetaminophen     Tablet .. 650 milliGRAM(s) Oral every 6 hours PRN Temp greater or equal to 38C (100.4F), Mild Pain (1 - 3)  aluminum hydroxide/magnesium hydroxide/simethicone Suspension 30 milliLiter(s) Oral every 4 hours PRN Dyspepsia  dextrose Oral Gel 15 Gram(s) Oral once PRN Blood Glucose LESS THAN 70 milliGRAM(s)/deciliter  melatonin 3 milliGRAM(s) Oral at bedtime PRN Insomnia  OLANZapine 1.25 milliGRAM(s) Oral every 6 hours PRN agitation, not redirectable  OLANZapine Injectable 1.25 milliGRAM(s) IntraMuscular every 6 hours PRN agitation, not redirectable  ondansetron Injectable 4 milliGRAM(s) IV Push every 8 hours PRN Nausea and/or Vomiting      CAPILLARY BLOOD GLUCOSE      POCT Blood Glucose.: 224 mg/dL (11 Nov 2024 11:11)  POCT Blood Glucose.: 175 mg/dL (11 Nov 2024 07:48)  POCT Blood Glucose.: 199 mg/dL (10 Nov 2024 21:48)  POCT Blood Glucose.: 188 mg/dL (10 Nov 2024 16:25)    I&O's Summary      PHYSICAL EXAM:  Vital Signs Last 24 Hrs  T(C): 36.9 (11 Nov 2024 11:07), Max: 36.9 (10 Nov 2024 20:18)  T(F): 98.4 (11 Nov 2024 11:07), Max: 98.5 (10 Nov 2024 20:18)  HR: 86 (11 Nov 2024 11:07) (81 - 96)  BP: 150/73 (11 Nov 2024 11:07) (133/64 - 154/80)  BP(mean): --  RR: 18 (11 Nov 2024 11:07) (18 - 18)  SpO2: 100% (11 Nov 2024 11:07) (100% - 100%)    Parameters below as of 11 Nov 2024 11:07  Patient On (Oxygen Delivery Method): room air        CONSTITUTIONAL: NAD, well-developed  RESPIRATORY: Normal respiratory effort; lungs are clear to auscultation bilaterally  CARDIOVASCULAR: Regular rate and rhythm, normal S1 and S2, no murmur/rub/gallop; No lower extremity edema; Peripheral pulses are 2+ bilaterally  ABDOMEN: Nontender to palpation, normoactive bowel sounds, no rebound/guarding; No hepatosplenomegaly  MUSCULOSKELETAL: no clubbing or cyanosis of digits; no joint swelling or tenderness to palpation  PSYCH: Alert and calm    LABS:                      RADIOLOGY & ADDITIONAL TESTS:  Results Reviewed:   Imaging Personally Reviewed:  Electrocardiogram Personally Reviewed:    COORDINATION OF CARE:  Care Discussed with Consultants/Other Providers [Y/N]:  Prior or Outpatient Records Reviewed [Y/N]:

## 2024-11-11 NOTE — PROGRESS NOTE ADULT - ASSESSMENT
69F dementia, h/o CVA 9/11/23 (subsequent L eye blindness), HTN, DM, a/w increased agitation and hypertensive urgency, now resolved.   Been calm since admission

## 2024-11-11 NOTE — PROGRESS NOTE ADULT - NSPROGADDITIONALINFOA_GEN_ALL_CORE
11/6 d/w daughter [phone] - pt with h/o CVA 9/11/23 (subsequent L eye blindness), was at rehab in [facility] till January. Has had 24h caretaker who just quit this week b/c pt more agitated. Having trouble caring for pt at home. Now wants LTC placement. SW to f/u.

## 2024-11-12 LAB
GLUCOSE BLDC GLUCOMTR-MCNC: 157 MG/DL — HIGH (ref 70–99)
GLUCOSE BLDC GLUCOMTR-MCNC: 187 MG/DL — HIGH (ref 70–99)
GLUCOSE BLDC GLUCOMTR-MCNC: 208 MG/DL — HIGH (ref 70–99)
GLUCOSE BLDC GLUCOMTR-MCNC: 219 MG/DL — HIGH (ref 70–99)
GLUCOSE BLDC GLUCOMTR-MCNC: 224 MG/DL — HIGH (ref 70–99)

## 2024-11-12 PROCEDURE — 99231 SBSQ HOSP IP/OBS SF/LOW 25: CPT

## 2024-11-12 RX ADMIN — QUETIAPINE FUMARATE 25 MILLIGRAM(S): 200 TABLET ORAL at 22:03

## 2024-11-12 RX ADMIN — Medication 81 MILLIGRAM(S): at 11:42

## 2024-11-12 RX ADMIN — Medication 2: at 07:53

## 2024-11-12 RX ADMIN — Medication 1: at 11:42

## 2024-11-12 RX ADMIN — Medication 80 MILLIGRAM(S): at 22:03

## 2024-11-12 RX ADMIN — Medication 5 MILLIGRAM(S): at 07:53

## 2024-11-12 RX ADMIN — Medication 1: at 16:52

## 2024-11-12 RX ADMIN — ESCITALOPRAM 10 MILLIGRAM(S): 10 TABLET, FILM COATED ORAL at 11:42

## 2024-11-12 RX ADMIN — Medication 40 MILLIGRAM(S): at 07:53

## 2024-11-12 RX ADMIN — Medication 40 MILLIGRAM(S): at 16:52

## 2024-11-12 NOTE — DIETITIAN INITIAL EVALUATION ADULT - PERTINENT LABORATORY DATA
28 week labs today.  Declines Tdap vaccine.  Reports feeling fine, denies concerns.  Struggling with seasonal allergies.  Reviewed s/s of  labor.       POCT Blood Glucose.: 187 mg/dL (11-12-24 @ 10:57)  A1C with Estimated Average Glucose Result: 6.6 % (11-06-24 @ 02:30)  A1C with Estimated Average Glucose Result: 6.9 % (04-17-24 @ 08:14)

## 2024-11-12 NOTE — PROGRESS NOTE ADULT - ASSESSMENT
69F dementia, h/o CVA 9/11/23 (subsequent L eye blindness), HTN, DM, a/w increased agitation and hypertensive urgency, now resolved. Been calm since admission. Pending LTC placement, as per family request.

## 2024-11-12 NOTE — PROGRESS NOTE ADULT - SUBJECTIVE AND OBJECTIVE BOX
PROGRESS NOTE:   Authored by Dr. Alondra Lundy MD, pager [phone]     Patient is a 69y old  Female who presents with a chief complaint of hypertensive urgency, increased agitation (11 Nov 2024 14:29)      SUBJECTIVE / OVERNIGHT EVENTS:  Pt is lying in bed. Ate most of her lunch. Feeling well. No complaints.     ADDITIONAL REVIEW OF SYSTEMS:    MEDICATIONS  (STANDING):  amLODIPine   Tablet 5 milliGRAM(s) Oral with breakfast  aspirin enteric coated 81 milliGRAM(s) Oral daily  atorvastatin 80 milliGRAM(s) Oral at bedtime  dextrose 5%. 1000 milliLiter(s) (50 mL/Hr) IV Continuous <Continuous>  dextrose 5%. 1000 milliLiter(s) (100 mL/Hr) IV Continuous <Continuous>  dextrose 50% Injectable 25 Gram(s) IV Push once  dextrose 50% Injectable 12.5 Gram(s) IV Push once  dextrose 50% Injectable 25 Gram(s) IV Push once  enoxaparin Injectable 40 milliGRAM(s) SubCutaneous every 24 hours  escitalopram 10 milliGRAM(s) Oral daily  glucagon  Injectable 1 milliGRAM(s) IntraMuscular once  influenza  Vaccine (HIGH DOSE) 0.5 milliLiter(s) IntraMuscular once  insulin lispro (ADMELOG) corrective regimen sliding scale   SubCutaneous three times a day before meals  insulin lispro (ADMELOG) corrective regimen sliding scale   SubCutaneous at bedtime  lisinopril 40 milliGRAM(s) Oral with breakfast  QUEtiapine 25 milliGRAM(s) Oral at bedtime    MEDICATIONS  (PRN):  acetaminophen     Tablet .. 650 milliGRAM(s) Oral every 6 hours PRN Temp greater or equal to 38C (100.4F), Mild Pain (1 - 3)  aluminum hydroxide/magnesium hydroxide/simethicone Suspension 30 milliLiter(s) Oral every 4 hours PRN Dyspepsia  dextrose Oral Gel 15 Gram(s) Oral once PRN Blood Glucose LESS THAN 70 milliGRAM(s)/deciliter  melatonin 3 milliGRAM(s) Oral at bedtime PRN Insomnia  OLANZapine 1.25 milliGRAM(s) Oral every 6 hours PRN agitation, not redirectable  OLANZapine Injectable 1.25 milliGRAM(s) IntraMuscular every 6 hours PRN agitation, not redirectable  ondansetron Injectable 4 milliGRAM(s) IV Push every 8 hours PRN Nausea and/or Vomiting      CAPILLARY BLOOD GLUCOSE      POCT Blood Glucose.: 187 mg/dL (12 Nov 2024 10:57)  POCT Blood Glucose.: 208 mg/dL (12 Nov 2024 07:42)  POCT Blood Glucose.: 238 mg/dL (11 Nov 2024 21:00)  POCT Blood Glucose.: 144 mg/dL (11 Nov 2024 16:36)    I&O's Summary      PHYSICAL EXAM:  Vital Signs Last 24 Hrs  T(C): 36.9 (12 Nov 2024 11:06), Max: 37.6 (11 Nov 2024 19:47)  T(F): 98.4 (12 Nov 2024 11:06), Max: 99.6 (11 Nov 2024 19:47)  HR: 80 (12 Nov 2024 11:06) (74 - 85)  BP: 135/80 (12 Nov 2024 11:06) (130/77 - 144/78)  BP(mean): --  RR: 18 (12 Nov 2024 11:06) (18 - 18)  SpO2: 100% (12 Nov 2024 11:06) (100% - 100%)    Parameters below as of 12 Nov 2024 11:06  Patient On (Oxygen Delivery Method): room air        CONSTITUTIONAL: NAD, well-developed  RESPIRATORY: Normal respiratory effort; lungs are clear to auscultation bilaterally  CARDIOVASCULAR: Regular rate and rhythm, normal S1 and S2, no murmur/rub/gallop; No lower extremity edema; Peripheral pulses are 2+ bilaterally  ABDOMEN: Nontender to palpation, normoactive bowel sounds, no rebound/guarding; No hepatosplenomegaly  MUSCULOSKELETAL: no clubbing or cyanosis of digits; no joint swelling or tenderness to palpation  PSYCH: A+O to person, place, and time; affect appropriate    LABS:                      RADIOLOGY & ADDITIONAL TESTS:  Results Reviewed:   Imaging Personally Reviewed:  Electrocardiogram Personally Reviewed:    COORDINATION OF CARE:  Care Discussed with Consultants/Other Providers [Y/N]:  Prior or Outpatient Records Reviewed [Y/N]:

## 2024-11-12 NOTE — DIETITIAN INITIAL EVALUATION ADULT - OTHER INFO
69F dementia, h/o CVA 9/11/23 (subsequent L eye blindness), HTN, DM, a/w increased agitation and hypertensive urgency, now resolved. Been calm since admission. Pending LTC placement, as per family request.    Pt seen confused, RN reports 50-75% intake of meals with No GI distress (nausea/vomiting/diarrhea/constipation.) at present. Noted skin ecchymosis, no edema per nursing flow sheet. Labs reviewed. A1c- 6.6% (H) (11/6/24).

## 2024-11-12 NOTE — DIETITIAN INITIAL EVALUATION ADULT - PERTINENT MEDS FT
MEDICATIONS  (STANDING):  amLODIPine   Tablet 5 milliGRAM(s) Oral with breakfast  aspirin enteric coated 81 milliGRAM(s) Oral daily  atorvastatin 80 milliGRAM(s) Oral at bedtime  dextrose 5%. 1000 milliLiter(s) (50 mL/Hr) IV Continuous <Continuous>  dextrose 5%. 1000 milliLiter(s) (100 mL/Hr) IV Continuous <Continuous>  dextrose 50% Injectable 25 Gram(s) IV Push once  dextrose 50% Injectable 12.5 Gram(s) IV Push once  dextrose 50% Injectable 25 Gram(s) IV Push once  enoxaparin Injectable 40 milliGRAM(s) SubCutaneous every 24 hours  escitalopram 10 milliGRAM(s) Oral daily  glucagon  Injectable 1 milliGRAM(s) IntraMuscular once  influenza  Vaccine (HIGH DOSE) 0.5 milliLiter(s) IntraMuscular once  insulin lispro (ADMELOG) corrective regimen sliding scale   SubCutaneous three times a day before meals  insulin lispro (ADMELOG) corrective regimen sliding scale   SubCutaneous at bedtime  lisinopril 40 milliGRAM(s) Oral with breakfast  QUEtiapine 25 milliGRAM(s) Oral at bedtime    MEDICATIONS  (PRN):  acetaminophen     Tablet .. 650 milliGRAM(s) Oral every 6 hours PRN Temp greater or equal to 38C (100.4F), Mild Pain (1 - 3)  aluminum hydroxide/magnesium hydroxide/simethicone Suspension 30 milliLiter(s) Oral every 4 hours PRN Dyspepsia  dextrose Oral Gel 15 Gram(s) Oral once PRN Blood Glucose LESS THAN 70 milliGRAM(s)/deciliter  melatonin 3 milliGRAM(s) Oral at bedtime PRN Insomnia  OLANZapine 1.25 milliGRAM(s) Oral every 6 hours PRN agitation, not redirectable  OLANZapine Injectable 1.25 milliGRAM(s) IntraMuscular every 6 hours PRN agitation, not redirectable  ondansetron Injectable 4 milliGRAM(s) IV Push every 8 hours PRN Nausea and/or Vomiting

## 2024-11-12 NOTE — DIETITIAN INITIAL EVALUATION ADULT - NS FNS DIET ORDER
Diet, Regular:   Consistent Carbohydrate {Evening Snack} (CSTCHOSN)  DASH/TLC {Sodium & Cholesterol Restricted} (DASH)  Easy to Chew (EASYTOCHEW) (11-05-24 @ 20:52) [Active]  Diet, Consistent Carbohydrate w/Evening Snack:   Low Sodium (11-05-24 @ 18:28) [Hold]

## 2024-11-13 LAB
GLUCOSE BLDC GLUCOMTR-MCNC: 175 MG/DL — HIGH (ref 70–99)
GLUCOSE BLDC GLUCOMTR-MCNC: 181 MG/DL — HIGH (ref 70–99)
GLUCOSE BLDC GLUCOMTR-MCNC: 188 MG/DL — HIGH (ref 70–99)
GLUCOSE BLDC GLUCOMTR-MCNC: 261 MG/DL — HIGH (ref 70–99)

## 2024-11-13 PROCEDURE — 99231 SBSQ HOSP IP/OBS SF/LOW 25: CPT

## 2024-11-13 RX ADMIN — ESCITALOPRAM 10 MILLIGRAM(S): 10 TABLET, FILM COATED ORAL at 11:33

## 2024-11-13 RX ADMIN — QUETIAPINE FUMARATE 25 MILLIGRAM(S): 200 TABLET ORAL at 22:00

## 2024-11-13 RX ADMIN — Medication 5 MILLIGRAM(S): at 07:46

## 2024-11-13 RX ADMIN — Medication 40 MILLIGRAM(S): at 17:04

## 2024-11-13 RX ADMIN — Medication 1: at 21:59

## 2024-11-13 RX ADMIN — Medication 81 MILLIGRAM(S): at 11:33

## 2024-11-13 RX ADMIN — Medication 80 MILLIGRAM(S): at 22:00

## 2024-11-13 RX ADMIN — Medication 1: at 11:34

## 2024-11-13 RX ADMIN — Medication 1: at 16:56

## 2024-11-13 RX ADMIN — Medication 40 MILLIGRAM(S): at 07:46

## 2024-11-13 RX ADMIN — Medication 1: at 07:45

## 2024-11-13 NOTE — PROGRESS NOTE ADULT - SUBJECTIVE AND OBJECTIVE BOX
PROGRESS NOTE:   Authored by Dr. Alondra Lundy MD, pager [phone]     Patient is a 69y old  Female who presents with a chief complaint of Disorientation     (12 Nov 2024 13:25)      SUBJECTIVE / OVERNIGHT EVENTS:  Pt is sitting in chair. Ate breakfast. Has remained calm without need for PRNs.     ADDITIONAL REVIEW OF SYSTEMS:    MEDICATIONS  (STANDING):  amLODIPine   Tablet 5 milliGRAM(s) Oral with breakfast  aspirin enteric coated 81 milliGRAM(s) Oral daily  atorvastatin 80 milliGRAM(s) Oral at bedtime  dextrose 5%. 1000 milliLiter(s) (50 mL/Hr) IV Continuous <Continuous>  dextrose 5%. 1000 milliLiter(s) (100 mL/Hr) IV Continuous <Continuous>  dextrose 50% Injectable 25 Gram(s) IV Push once  dextrose 50% Injectable 12.5 Gram(s) IV Push once  dextrose 50% Injectable 25 Gram(s) IV Push once  enoxaparin Injectable 40 milliGRAM(s) SubCutaneous every 24 hours  escitalopram 10 milliGRAM(s) Oral daily  glucagon  Injectable 1 milliGRAM(s) IntraMuscular once  influenza  Vaccine (HIGH DOSE) 0.5 milliLiter(s) IntraMuscular once  insulin lispro (ADMELOG) corrective regimen sliding scale   SubCutaneous three times a day before meals  insulin lispro (ADMELOG) corrective regimen sliding scale   SubCutaneous at bedtime  lisinopril 40 milliGRAM(s) Oral with breakfast  QUEtiapine 25 milliGRAM(s) Oral at bedtime    MEDICATIONS  (PRN):  acetaminophen     Tablet .. 650 milliGRAM(s) Oral every 6 hours PRN Temp greater or equal to 38C (100.4F), Mild Pain (1 - 3)  aluminum hydroxide/magnesium hydroxide/simethicone Suspension 30 milliLiter(s) Oral every 4 hours PRN Dyspepsia  dextrose Oral Gel 15 Gram(s) Oral once PRN Blood Glucose LESS THAN 70 milliGRAM(s)/deciliter  melatonin 3 milliGRAM(s) Oral at bedtime PRN Insomnia  OLANZapine 1.25 milliGRAM(s) Oral every 6 hours PRN agitation, not redirectable  OLANZapine Injectable 1.25 milliGRAM(s) IntraMuscular every 6 hours PRN agitation, not redirectable  ondansetron Injectable 4 milliGRAM(s) IV Push every 8 hours PRN Nausea and/or Vomiting      CAPILLARY BLOOD GLUCOSE      POCT Blood Glucose.: 181 mg/dL (13 Nov 2024 11:04)  POCT Blood Glucose.: 175 mg/dL (13 Nov 2024 07:35)  POCT Blood Glucose.: 219 mg/dL (12 Nov 2024 22:09)  POCT Blood Glucose.: 224 mg/dL (12 Nov 2024 22:01)    I&O's Summary      PHYSICAL EXAM:  Vital Signs Last 24 Hrs  T(C): 36.7 (13 Nov 2024 11:46), Max: 36.9 (12 Nov 2024 22:39)  T(F): 98 (13 Nov 2024 11:46), Max: 98.4 (12 Nov 2024 22:39)  HR: 75 (13 Nov 2024 11:46) (74 - 81)  BP: 137/70 (13 Nov 2024 11:46) (136/77 - 150/81)  BP(mean): --  RR: 20 (13 Nov 2024 11:46) (18 - 20)  SpO2: 100% (13 Nov 2024 11:46) (100% - 100%)    Parameters below as of 13 Nov 2024 11:46  Patient On (Oxygen Delivery Method): room air        CONSTITUTIONAL: NAD, well-developed  RESPIRATORY: Normal respiratory effort; lungs are clear to auscultation bilaterally  CARDIOVASCULAR: Regular rate and rhythm, normal S1 and S2, no murmur/rub/gallop; No lower extremity edema; Peripheral pulses are 2+ bilaterally  ABDOMEN: Nontender to palpation, normoactive bowel sounds, no rebound/guarding; No hepatosplenomegaly  MUSCULOSKELETAL: no clubbing or cyanosis of digits; no joint swelling or tenderness to palpation  PSYCH: Alert and calm    LABS:                      RADIOLOGY & ADDITIONAL TESTS:  Results Reviewed:   Imaging Personally Reviewed:  Electrocardiogram Personally Reviewed:    COORDINATION OF CARE:  Care Discussed with Consultants/Other Providers [Y/N]:  Prior or Outpatient Records Reviewed [Y/N]:

## 2024-11-14 LAB
GLUCOSE BLDC GLUCOMTR-MCNC: 136 MG/DL — HIGH (ref 70–99)
GLUCOSE BLDC GLUCOMTR-MCNC: 184 MG/DL — HIGH (ref 70–99)
GLUCOSE BLDC GLUCOMTR-MCNC: 225 MG/DL — HIGH (ref 70–99)
GLUCOSE BLDC GLUCOMTR-MCNC: 248 MG/DL — HIGH (ref 70–99)

## 2024-11-14 PROCEDURE — 99231 SBSQ HOSP IP/OBS SF/LOW 25: CPT

## 2024-11-14 RX ADMIN — QUETIAPINE FUMARATE 25 MILLIGRAM(S): 200 TABLET ORAL at 23:07

## 2024-11-14 RX ADMIN — Medication 81 MILLIGRAM(S): at 11:46

## 2024-11-14 RX ADMIN — Medication 1: at 08:31

## 2024-11-14 RX ADMIN — Medication 40 MILLIGRAM(S): at 17:13

## 2024-11-14 RX ADMIN — Medication 5 MILLIGRAM(S): at 08:32

## 2024-11-14 RX ADMIN — Medication 80 MILLIGRAM(S): at 23:07

## 2024-11-14 RX ADMIN — ESCITALOPRAM 10 MILLIGRAM(S): 10 TABLET, FILM COATED ORAL at 11:46

## 2024-11-14 RX ADMIN — Medication 40 MILLIGRAM(S): at 08:32

## 2024-11-14 RX ADMIN — Medication 2: at 11:48

## 2024-11-14 NOTE — PROGRESS NOTE ADULT - SUBJECTIVE AND OBJECTIVE BOX
PROGRESS NOTE:   Authored by Dr. Alondra Lundy MD, pager [phone]     Patient is a 69y old  Female who presents with a chief complaint of hypertensive urgency, increased agitation (13 Nov 2024 16:30)      SUBJECTIVE / OVERNIGHT EVENTS:  Pt is lying in bed. Ate breakfast. Has remained calm. No complaints.     ADDITIONAL REVIEW OF SYSTEMS:    MEDICATIONS  (STANDING):  amLODIPine   Tablet 5 milliGRAM(s) Oral with breakfast  aspirin enteric coated 81 milliGRAM(s) Oral daily  atorvastatin 80 milliGRAM(s) Oral at bedtime  dextrose 5%. 1000 milliLiter(s) (50 mL/Hr) IV Continuous <Continuous>  dextrose 5%. 1000 milliLiter(s) (100 mL/Hr) IV Continuous <Continuous>  dextrose 50% Injectable 25 Gram(s) IV Push once  dextrose 50% Injectable 12.5 Gram(s) IV Push once  dextrose 50% Injectable 25 Gram(s) IV Push once  enoxaparin Injectable 40 milliGRAM(s) SubCutaneous every 24 hours  escitalopram 10 milliGRAM(s) Oral daily  glucagon  Injectable 1 milliGRAM(s) IntraMuscular once  influenza  Vaccine (HIGH DOSE) 0.5 milliLiter(s) IntraMuscular once  insulin lispro (ADMELOG) corrective regimen sliding scale   SubCutaneous three times a day before meals  insulin lispro (ADMELOG) corrective regimen sliding scale   SubCutaneous at bedtime  lisinopril 40 milliGRAM(s) Oral with breakfast  QUEtiapine 25 milliGRAM(s) Oral at bedtime    MEDICATIONS  (PRN):  acetaminophen     Tablet .. 650 milliGRAM(s) Oral every 6 hours PRN Temp greater or equal to 38C (100.4F), Mild Pain (1 - 3)  aluminum hydroxide/magnesium hydroxide/simethicone Suspension 30 milliLiter(s) Oral every 4 hours PRN Dyspepsia  dextrose Oral Gel 15 Gram(s) Oral once PRN Blood Glucose LESS THAN 70 milliGRAM(s)/deciliter  melatonin 3 milliGRAM(s) Oral at bedtime PRN Insomnia  OLANZapine 1.25 milliGRAM(s) Oral every 6 hours PRN agitation, not redirectable  OLANZapine Injectable 1.25 milliGRAM(s) IntraMuscular every 6 hours PRN agitation, not redirectable  ondansetron Injectable 4 milliGRAM(s) IV Push every 8 hours PRN Nausea and/or Vomiting      CAPILLARY BLOOD GLUCOSE      POCT Blood Glucose.: 184 mg/dL (14 Nov 2024 07:51)  POCT Blood Glucose.: 261 mg/dL (13 Nov 2024 21:53)  POCT Blood Glucose.: 188 mg/dL (13 Nov 2024 16:39)  POCT Blood Glucose.: 181 mg/dL (13 Nov 2024 11:04)    I&O's Summary      PHYSICAL EXAM:  Vital Signs Last 24 Hrs  T(C): 36.7 (14 Nov 2024 05:34), Max: 36.7 (13 Nov 2024 11:46)  T(F): 98 (14 Nov 2024 05:34), Max: 98.1 (13 Nov 2024 20:19)  HR: 76 (14 Nov 2024 05:34) (75 - 86)  BP: 112/67 (14 Nov 2024 05:34) (112/67 - 139/77)  BP(mean): --  RR: 18 (14 Nov 2024 05:34) (18 - 20)  SpO2: 100% (14 Nov 2024 05:34) (99% - 100%)    Parameters below as of 14 Nov 2024 05:34  Patient On (Oxygen Delivery Method): room air        CONSTITUTIONAL: NAD, well-developed  RESPIRATORY: Normal respiratory effort; lungs are clear to auscultation bilaterally  CARDIOVASCULAR: Regular rate and rhythm, normal S1 and S2, no murmur/rub/gallop; No lower extremity edema; Peripheral pulses are 2+ bilaterally  ABDOMEN: Nontender to palpation, normoactive bowel sounds, no rebound/guarding; No hepatosplenomegaly  MUSCULOSKELETAL: no clubbing or cyanosis of digits; no joint swelling or tenderness to palpation  PSYCH: Alert and calm    LABS:                      RADIOLOGY & ADDITIONAL TESTS:  Results Reviewed:   Imaging Personally Reviewed:  Electrocardiogram Personally Reviewed:    COORDINATION OF CARE:  Care Discussed with Consultants/Other Providers [Y/N]:  Prior or Outpatient Records Reviewed [Y/N]:

## 2024-11-15 VITALS
DIASTOLIC BLOOD PRESSURE: 80 MMHG | RESPIRATION RATE: 18 BRPM | SYSTOLIC BLOOD PRESSURE: 147 MMHG | TEMPERATURE: 98 F | OXYGEN SATURATION: 100 % | HEART RATE: 73 BPM

## 2024-11-15 DIAGNOSIS — Z29.9 ENCOUNTER FOR PROPHYLACTIC MEASURES, UNSPECIFIED: ICD-10-CM

## 2024-11-15 LAB
GLUCOSE BLDC GLUCOMTR-MCNC: 165 MG/DL — HIGH (ref 70–99)
GLUCOSE BLDC GLUCOMTR-MCNC: 181 MG/DL — HIGH (ref 70–99)
GLUCOSE BLDC GLUCOMTR-MCNC: 200 MG/DL — HIGH (ref 70–99)

## 2024-11-15 PROCEDURE — 99239 HOSP IP/OBS DSCHRG MGMT >30: CPT

## 2024-11-15 RX ORDER — AMLODIPINE BESYLATE 10 MG
1 TABLET ORAL
Qty: 0 | Refills: 0 | DISCHARGE
Start: 2024-11-15

## 2024-11-15 RX ORDER — ASPIRIN/MAG CARB/ALUMINUM AMIN 325 MG
1 TABLET ORAL
Qty: 0 | Refills: 0 | DISCHARGE
Start: 2024-11-15

## 2024-11-15 RX ORDER — LISINOPRIL 40 MG
1 TABLET ORAL
Qty: 0 | Refills: 0 | DISCHARGE
Start: 2024-11-15

## 2024-11-15 RX ORDER — ESCITALOPRAM 10 MG/1
1 TABLET, FILM COATED ORAL
Refills: 0 | DISCHARGE

## 2024-11-15 RX ORDER — ASPIRIN/MAG CARB/ALUMINUM AMIN 325 MG
1 TABLET ORAL
Refills: 0 | DISCHARGE

## 2024-11-15 RX ORDER — AMLODIPINE AND BENAZEPRIL HYDROCHLORIDE 5; 20 MG/1; MG/1
1 CAPSULE ORAL
Refills: 0 | DISCHARGE

## 2024-11-15 RX ORDER — QUETIAPINE FUMARATE 200 MG/1
1 TABLET ORAL
Qty: 0 | Refills: 0 | DISCHARGE
Start: 2024-11-15

## 2024-11-15 RX ORDER — QUETIAPINE FUMARATE 200 MG/1
1 TABLET ORAL
Refills: 0 | DISCHARGE

## 2024-11-15 RX ORDER — GLIPIZIDE 5 MG
1 TABLET ORAL
Refills: 0 | DISCHARGE

## 2024-11-15 RX ORDER — ESCITALOPRAM 10 MG/1
1 TABLET, FILM COATED ORAL
Qty: 0 | Refills: 0 | DISCHARGE
Start: 2024-11-15

## 2024-11-15 RX ADMIN — Medication 1: at 12:01

## 2024-11-15 RX ADMIN — Medication 1: at 08:05

## 2024-11-15 RX ADMIN — Medication 1: at 17:19

## 2024-11-15 RX ADMIN — Medication 40 MILLIGRAM(S): at 08:05

## 2024-11-15 RX ADMIN — Medication 40 MILLIGRAM(S): at 14:32

## 2024-11-15 RX ADMIN — ESCITALOPRAM 10 MILLIGRAM(S): 10 TABLET, FILM COATED ORAL at 14:31

## 2024-11-15 RX ADMIN — Medication 5 MILLIGRAM(S): at 08:05

## 2024-11-15 RX ADMIN — Medication 81 MILLIGRAM(S): at 14:31

## 2024-11-15 NOTE — PROGRESS NOTE ADULT - SUBJECTIVE AND OBJECTIVE BOX
PROGRESS NOTE:   Authored by Dr. Alondra Lundy MD, pager [phone]     Patient is a 69y old  Female who presents with a chief complaint of hypertensive urgency, increased agitation (14 Nov 2024 10:42)      SUBJECTIVE / OVERNIGHT EVENTS:  Pt is lying in bed. Doing well. Ate breakfast. No fevers, chills, N/V/D, dysuria, hematuria, CP, or SOB.     ADDITIONAL REVIEW OF SYSTEMS:    MEDICATIONS  (STANDING):  amLODIPine   Tablet 5 milliGRAM(s) Oral with breakfast  aspirin enteric coated 81 milliGRAM(s) Oral daily  atorvastatin 80 milliGRAM(s) Oral at bedtime  dextrose 5%. 1000 milliLiter(s) (100 mL/Hr) IV Continuous <Continuous>  dextrose 5%. 1000 milliLiter(s) (50 mL/Hr) IV Continuous <Continuous>  dextrose 50% Injectable 25 Gram(s) IV Push once  dextrose 50% Injectable 12.5 Gram(s) IV Push once  dextrose 50% Injectable 25 Gram(s) IV Push once  enoxaparin Injectable 40 milliGRAM(s) SubCutaneous every 24 hours  escitalopram 10 milliGRAM(s) Oral daily  glucagon  Injectable 1 milliGRAM(s) IntraMuscular once  influenza  Vaccine (HIGH DOSE) 0.5 milliLiter(s) IntraMuscular once  insulin lispro (ADMELOG) corrective regimen sliding scale   SubCutaneous three times a day before meals  insulin lispro (ADMELOG) corrective regimen sliding scale   SubCutaneous at bedtime  lisinopril 40 milliGRAM(s) Oral with breakfast  QUEtiapine 25 milliGRAM(s) Oral at bedtime    MEDICATIONS  (PRN):  acetaminophen     Tablet .. 650 milliGRAM(s) Oral every 6 hours PRN Temp greater or equal to 38C (100.4F), Mild Pain (1 - 3)  aluminum hydroxide/magnesium hydroxide/simethicone Suspension 30 milliLiter(s) Oral every 4 hours PRN Dyspepsia  dextrose Oral Gel 15 Gram(s) Oral once PRN Blood Glucose LESS THAN 70 milliGRAM(s)/deciliter  melatonin 3 milliGRAM(s) Oral at bedtime PRN Insomnia  OLANZapine 1.25 milliGRAM(s) Oral every 6 hours PRN agitation, not redirectable  OLANZapine Injectable 1.25 milliGRAM(s) IntraMuscular every 6 hours PRN agitation, not redirectable  ondansetron Injectable 4 milliGRAM(s) IV Push every 8 hours PRN Nausea and/or Vomiting      CAPILLARY BLOOD GLUCOSE      POCT Blood Glucose.: 200 mg/dL (15 Nov 2024 10:48)  POCT Blood Glucose.: 181 mg/dL (15 Nov 2024 07:53)  POCT Blood Glucose.: 225 mg/dL (14 Nov 2024 22:06)  POCT Blood Glucose.: 136 mg/dL (14 Nov 2024 16:33)  POCT Blood Glucose.: 248 mg/dL (14 Nov 2024 11:26)    I&O's Summary      PHYSICAL EXAM:  Vital Signs Last 24 Hrs  T(C): 36.4 (15 Nov 2024 05:35), Max: 37.2 (14 Nov 2024 19:39)  T(F): 97.6 (15 Nov 2024 05:35), Max: 99 (14 Nov 2024 19:39)  HR: 72 (15 Nov 2024 05:35) (72 - 82)  BP: 121/65 (15 Nov 2024 05:35) (121/65 - 145/80)  BP(mean): 79 (15 Nov 2024 05:35) (79 - 79)  RR: 18 (15 Nov 2024 05:35) (18 - 18)  SpO2: 100% (15 Nov 2024 05:35) (100% - 100%)    Parameters below as of 15 Nov 2024 05:35  Patient On (Oxygen Delivery Method): room air        CONSTITUTIONAL: NAD, well-developed  RESPIRATORY: Normal respiratory effort; lungs are clear to auscultation bilaterally  CARDIOVASCULAR: Regular rate and rhythm, normal S1 and S2, no murmur/rub/gallop; No lower extremity edema; Peripheral pulses are 2+ bilaterally  ABDOMEN: Nontender to palpation, normoactive bowel sounds, no rebound/guarding; No hepatosplenomegaly  MUSCULOSKELETAL: no clubbing or cyanosis of digits; no joint swelling or tenderness to palpation  PSYCH: Alert and calm    LABS:                      RADIOLOGY & ADDITIONAL TESTS:  Results Reviewed:   Imaging Personally Reviewed:  Electrocardiogram Personally Reviewed:    COORDINATION OF CARE:  Care Discussed with Consultants/Other Providers [Y/N]:  Prior or Outpatient Records Reviewed [Y/N]:   done

## 2024-11-15 NOTE — DISCHARGE NOTE NURSING/CASE MANAGEMENT/SOCIAL WORK - NSDCCRNAME_GEN_ALL_CORE_FT
[facility] [place] Address: [address] [place] [phone]

## 2024-11-15 NOTE — PROGRESS NOTE ADULT - PROBLEM SELECTOR PLAN 3
Chronic moderate exacerbation  Hold home Trulicity weekly, glipizide 10mg daily, metformin 500mg nightly   LDCS with diabetic diet     A1c 6.6
-Hold home Trulicity weekly, glipizide 10mg daily, metformin 50 0mg nightly   -LDCS with diabetic diet   A1c 6.6
Chronic moderate exacerbation  Hold home Trulicity weekly, glipizide 10mg daily, metformin 500mg nightly   LDCS with diabetic diet     A1c 6.6

## 2024-11-15 NOTE — PROGRESS NOTE ADULT - PROBLEM SELECTOR PROBLEM 1
Dementia with behavioral disturbance

## 2024-11-15 NOTE — PROGRESS NOTE ADULT - PROBLEM SELECTOR PLAN 1
-worsening aggression with agitation  -UA/RVP/CXR wnlappreciate psych input - decreased Lexapro to 10 mg daily; c/w Seroquel 25 mg hs; Zyprexa 1.25q6 PRN  -currently behaviorally controlled; pt has been calm since admission, not needing PRNs

## 2024-11-15 NOTE — DISCHARGE NOTE NURSING/CASE MANAGEMENT/SOCIAL WORK - FINANCIAL ASSISTANCE
[facility] provides services at a reduced cost to those who are determined to be eligible through [facility]’s financial assistance program. Information regarding [facility]’s financial assistance program can be found by going to [email or web address] or by calling [phone].

## 2024-11-15 NOTE — DISCHARGE NOTE NURSING/CASE MANAGEMENT/SOCIAL WORK - PATIENT PORTAL LINK FT
You can access the FollowMyHealth Patient Portal offered by [facility] by registering at the following website: [email or web address]. By joining [facility]’s FollowMyHealth portal, you will also be able to view your health information using other applications (apps) compatible with our system.

## 2024-11-15 NOTE — PROGRESS NOTE ADULT - TIME BILLING
Preparing to see the patient including review of tests and other providers' notes, confirming history with patient/family member, performing medical examination and evaluation, counseling and educating the patient/family/caregiver, ordering medications, tests and procedures, communicating with other health care professionals, documenting clinical information in the EMR, independently interpreting results and communicating results to the patient/family/caregiver, care coordination
- Ordering, reviewing, and interpreting labs, testing, and imaging  - Independently obtaining a review of systems and performing a physical exam  - Counselling and educating patient and family regarding interpretation of aforementioned items and plan of care
Review of laboratory data, radiology results, consultants' recommendations, documentation in [place], discussion with patient/house staff and interdisciplinary staff (such as , social workers, etc). Interventions were performed as documented above.
- Ordering, reviewing, and interpreting labs, testing, and imaging  - Independently obtaining a review of systems and performing a physical exam  - Counselling and educating patient and family regarding interpretation of aforementioned items and plan of care
- Ordering, reviewing, and interpreting labs, testing, and imaging  - Independently obtaining a review of systems and performing a physical exam  - Counselling and educating patient and family regarding interpretation of aforementioned items and plan of care
Preparing to see the patient including review of tests and other providers' notes, confirming history with patient/family member, performing medical examination and evaluation, counseling and educating the patient/family/caregiver, ordering medications, tests and procedures, communicating with other health care professionals, documenting clinical information in the EMR, independently interpreting results and communicating results to the patient/family/caregiver, care coordination
Review of laboratory data, radiology results, consultants' recommendations, documentation in [place], discussion with patient/house staff and interdisciplinary staff (such as , social workers, etc). Interventions were performed as documented above.
Preparing to see the patient including review of tests and other providers' notes, confirming history with patient/family member, performing medical examination and evaluation, counseling and educating the patient/family/caregiver, ordering medications, tests and procedures, communicating with other health care professionals, documenting clinical information in the EMR, independently interpreting results and communicating results to the patient/family/caregiver, care coordination
Review of laboratory data, radiology results, consultants' recommendations, documentation in [place], discussion with patient/house staff and interdisciplinary staff (such as , social workers, etc). Interventions were performed as documented above.
Review of laboratory data, radiology results, consultants' recommendations, documentation in [place], discussion with patient/house staff and interdisciplinary staff (such as , social workers, etc). Interventions were performed as documented above.

## 2024-11-15 NOTE — DISCHARGE NOTE NURSING/CASE MANAGEMENT/SOCIAL WORK - NSDPFAC_GEN_ALL_CORE
[facility] [place] Address: [address] [place] [id number] Phone: [phone]

## 2024-11-15 NOTE — DISCHARGE NOTE NURSING/CASE MANAGEMENT/SOCIAL WORK - NSDCPEPTSTRK_GEN_ALL_CORE
Call 911 for stroke/Need for follow up after discharge/Prescribed medications/Risk factors for stroke/Stroke education booklet/Stroke support groups for patients, families, and friends/Stroke warning signs and symptoms/Signs and symptoms of stroke
Complex psychosocial needs/coping issues

## 2024-11-15 NOTE — PROGRESS NOTE ADULT - PROBLEM SELECTOR PROBLEM 3
T2DM (type 2 diabetes mellitus)

## 2024-11-15 NOTE — PROGRESS NOTE ADULT - REASON FOR ADMISSION
hypertensive urgency, increased agitation